# Patient Record
Sex: FEMALE | Race: BLACK OR AFRICAN AMERICAN | Employment: PART TIME | URBAN - METROPOLITAN AREA
[De-identification: names, ages, dates, MRNs, and addresses within clinical notes are randomized per-mention and may not be internally consistent; named-entity substitution may affect disease eponyms.]

---

## 2019-07-12 ENCOUNTER — OFFICE VISIT (OUTPATIENT)
Dept: INTERNAL MEDICINE CLINIC | Facility: CLINIC | Age: 39
End: 2019-07-12
Payer: COMMERCIAL

## 2019-07-12 VITALS
BODY MASS INDEX: 36.98 KG/M2 | HEIGHT: 67 IN | OXYGEN SATURATION: 98 % | SYSTOLIC BLOOD PRESSURE: 116 MMHG | DIASTOLIC BLOOD PRESSURE: 60 MMHG | WEIGHT: 235.63 LBS

## 2019-07-12 DIAGNOSIS — D50.0 IRON DEFICIENCY ANEMIA DUE TO CHRONIC BLOOD LOSS: ICD-10-CM

## 2019-07-12 DIAGNOSIS — L72.3 SEBACEOUS CYST: ICD-10-CM

## 2019-07-12 DIAGNOSIS — Z00.00 WELLNESS EXAMINATION: Primary | ICD-10-CM

## 2019-07-12 DIAGNOSIS — Z12.31 SCREENING MAMMOGRAM, ENCOUNTER FOR: ICD-10-CM

## 2019-07-12 PROCEDURE — 99386 PREV VISIT NEW AGE 40-64: CPT | Performed by: INTERNAL MEDICINE

## 2019-07-12 NOTE — PROGRESS NOTES
HPI:    Patient ID: Jatinder Bird is a 34year old female. Pt here for a general check up and getting established as a new pt.     Med hx; depression  Anemia iron deficiency  pyka for baking soda and baby powder  Surgical hx - Cholecystectomy  c sect normal heart sounds. Pulmonary/Chest: Effort normal. She has no wheezes. She has no rales. Abdominal: Soft. There is no tenderness.    Genitourinary:   Genitourinary Comments: Breasts no masses  No nipple anomaly   Musculoskeletal: She exhibits no tend

## 2019-07-16 ENCOUNTER — APPOINTMENT (OUTPATIENT)
Dept: LAB | Facility: HOSPITAL | Age: 39
End: 2019-07-16
Attending: INTERNAL MEDICINE
Payer: COMMERCIAL

## 2019-07-16 LAB
ALBUMIN SERPL-MCNC: 3.9 G/DL (ref 3.4–5)
ALBUMIN/GLOB SERPL: 0.9 {RATIO} (ref 1–2)
ALP LIVER SERPL-CCNC: 59 U/L (ref 37–98)
ALT SERPL-CCNC: 11 U/L (ref 13–56)
ANION GAP SERPL CALC-SCNC: 10 MMOL/L (ref 0–18)
AST SERPL-CCNC: 7 U/L (ref 15–37)
BASOPHILS # BLD AUTO: 0.12 X10(3) UL (ref 0–0.2)
BASOPHILS NFR BLD AUTO: 1.1 %
BILIRUB SERPL-MCNC: 0.9 MG/DL (ref 0.1–2)
BUN BLD-MCNC: 9 MG/DL (ref 7–18)
BUN/CREAT SERPL: 10.6 (ref 10–20)
CALCIUM BLD-MCNC: 8.5 MG/DL (ref 8.5–10.1)
CHLORIDE SERPL-SCNC: 104 MMOL/L (ref 98–112)
CHOLEST SMN-MCNC: 134 MG/DL (ref ?–200)
CO2 SERPL-SCNC: 24 MMOL/L (ref 21–32)
CREAT BLD-MCNC: 0.85 MG/DL (ref 0.55–1.02)
DEPRECATED HBV CORE AB SER IA-ACNC: 2.2 NG/ML (ref 12–240)
DEPRECATED RDW RBC AUTO: 43.6 FL (ref 35.1–46.3)
EOSINOPHIL # BLD AUTO: 0.23 X10(3) UL (ref 0–0.7)
EOSINOPHIL NFR BLD AUTO: 2.2 %
ERYTHROCYTE [DISTWIDTH] IN BLOOD BY AUTOMATED COUNT: 23.1 % (ref 11–15)
GLOBULIN PLAS-MCNC: 4.2 G/DL (ref 2.8–4.4)
GLUCOSE BLD-MCNC: 85 MG/DL (ref 70–99)
HCT VFR BLD AUTO: 27.7 % (ref 35–48)
HDLC SERPL-MCNC: 43 MG/DL (ref 40–59)
HGB BLD-MCNC: 7.2 G/DL (ref 12–16)
IMM GRANULOCYTES # BLD AUTO: 0.02 X10(3) UL (ref 0–1)
IMM GRANULOCYTES NFR BLD: 0.2 %
LDLC SERPL CALC-MCNC: 82 MG/DL (ref ?–100)
LYMPHOCYTES # BLD AUTO: 2.27 X10(3) UL (ref 1–4)
LYMPHOCYTES NFR BLD AUTO: 21.4 %
M PROTEIN MFR SERPL ELPH: 8.1 G/DL (ref 6.4–8.2)
MCH RBC QN AUTO: 14.9 PG (ref 26–34)
MCHC RBC AUTO-ENTMCNC: 26 G/DL (ref 31–37)
MCV RBC AUTO: 57.3 FL (ref 80–100)
MONOCYTES # BLD AUTO: 0.61 X10(3) UL (ref 0.1–1)
MONOCYTES NFR BLD AUTO: 5.8 %
NEUTROPHILS # BLD AUTO: 7.34 X10 (3) UL (ref 1.5–7.7)
NEUTROPHILS # BLD AUTO: 7.34 X10(3) UL (ref 1.5–7.7)
NEUTROPHILS NFR BLD AUTO: 69.3 %
NONHDLC SERPL-MCNC: 91 MG/DL (ref ?–130)
OSMOLALITY SERPL CALC.SUM OF ELEC: 284 MOSM/KG (ref 275–295)
PATIENT FASTING: YES
PATIENT FASTING: YES
PLATELET # BLD AUTO: 353 10(3)UL (ref 150–450)
PLATELET MORPHOLOGY: NORMAL
POTASSIUM SERPL-SCNC: 3.6 MMOL/L (ref 3.5–5.1)
RBC # BLD AUTO: 4.83 X10(6)UL (ref 3.8–5.3)
SODIUM SERPL-SCNC: 138 MMOL/L (ref 136–145)
TRIGL SERPL-MCNC: 45 MG/DL (ref 30–149)
TSI SER-ACNC: 2.01 MIU/ML (ref 0.36–3.74)
VLDLC SERPL CALC-MCNC: 9 MG/DL (ref 0–30)
WBC # BLD AUTO: 10.6 X10(3) UL (ref 4–11)

## 2019-07-16 PROCEDURE — 82728 ASSAY OF FERRITIN: CPT | Performed by: INTERNAL MEDICINE

## 2019-07-16 PROCEDURE — 80061 LIPID PANEL: CPT | Performed by: INTERNAL MEDICINE

## 2019-07-16 PROCEDURE — 85060 BLOOD SMEAR INTERPRETATION: CPT | Performed by: INTERNAL MEDICINE

## 2019-07-16 PROCEDURE — 85025 COMPLETE CBC W/AUTO DIFF WBC: CPT | Performed by: INTERNAL MEDICINE

## 2019-07-16 PROCEDURE — 36415 COLL VENOUS BLD VENIPUNCTURE: CPT | Performed by: INTERNAL MEDICINE

## 2019-07-16 PROCEDURE — 80053 COMPREHEN METABOLIC PANEL: CPT | Performed by: INTERNAL MEDICINE

## 2019-07-16 PROCEDURE — 84443 ASSAY THYROID STIM HORMONE: CPT | Performed by: INTERNAL MEDICINE

## 2019-07-23 ENCOUNTER — OFFICE VISIT (OUTPATIENT)
Dept: HEMATOLOGY/ONCOLOGY | Facility: HOSPITAL | Age: 39
End: 2019-07-23
Attending: INTERNAL MEDICINE
Payer: COMMERCIAL

## 2019-07-23 VITALS
HEIGHT: 67 IN | SYSTOLIC BLOOD PRESSURE: 110 MMHG | OXYGEN SATURATION: 98 % | RESPIRATION RATE: 16 BRPM | BODY MASS INDEX: 36.57 KG/M2 | DIASTOLIC BLOOD PRESSURE: 57 MMHG | HEART RATE: 95 BPM | TEMPERATURE: 98 F | WEIGHT: 233 LBS

## 2019-07-23 DIAGNOSIS — D50.0 IRON DEFICIENCY ANEMIA DUE TO CHRONIC BLOOD LOSS: Primary | ICD-10-CM

## 2019-07-23 PROCEDURE — 99244 OFF/OP CNSLTJ NEW/EST MOD 40: CPT | Performed by: INTERNAL MEDICINE

## 2019-07-23 RX ORDER — FOLIC ACID 1 MG/1
1 TABLET ORAL DAILY
Qty: 90 TABLET | Refills: 1 | Status: SHIPPED | OUTPATIENT
Start: 2019-07-23 | End: 2021-02-24

## 2019-07-23 NOTE — PROGRESS NOTES
HPI     Danni Riggs is a 34year old female who is here today for evaluation of anemia. The anemia has been present for at least the past 8 years. States she has been anemic since a teenager. Worse since 2011 when dx with fibroids.     Nutrit throat. Eyes: Negative for icterus. Respiratory: Positive for shortness of breath ([employer] with exhertion. ). Negative for cough. Cardiovascular: Positive for palpitations. Negative for chest pain.    Gastrointestinal: Positive for constipation (occasion frequency: Never      Drug use: No      Sexual activity: Not on file    Lifestyle      Physical activity:        Days per week: Not on file        Minutes per session: Not on file      Stress: Not on file    Relationships      Social connections:         Ta Mayda Ortiz is a 34year old female with Iron deficiency anemia due to chronic blood loss  (primary encounter diagnosis)      Anemia has been present for 8 years.     Recommend w/u as follows:    Patient is recommended to consider f/u with Gyn for MCH      26.0 - 34.0 pg 14.9 (L) 16.0 (L)   MCHC      31.0 - 37.0 g/dL 26.0 (L) 28.0 (L)   RDW-SD      35.1 - 46.3 fL 43.6    RDW      11.0 - 15.0 % 23.1 (H) 20.5 (H)   Platelet Count      238.3 - 450.0 10(3)uL 353.0 248.0   Prelim Neutrophil Abs      1. sample is collected from a person who is consuming high dose of biotin supplements resulting in biotin serum concentrations >100 ng/mL.  It is recommended that physicians ask all patients who may be on biotin supplementation to stop biotin consumption at [employer]

## 2019-07-23 NOTE — PATIENT INSTRUCTIONS
Iron Sucrose injection  Brand Name: Venofer  What is this medicine? IRON SUCROSE (ZIGGY guzman CORNELIO jonas) is an iron complex. Iron is used to make healthy red blood cells, which carry oxygen and nutrients throughout the body.  This medicine is used to treat i This drug is given in a hospital or clinic and will not be stored at home. What should I tell my health care provider before I take this medicine?   They need to know if you have any of these conditions:  · anemia not caused by low iron levels  · heart dis

## 2019-07-25 ENCOUNTER — TELEPHONE (OUTPATIENT)
Dept: HEMATOLOGY/ONCOLOGY | Facility: HOSPITAL | Age: 39
End: 2019-07-25

## 2019-07-25 NOTE — TELEPHONE ENCOUNTER
Patient would like FMLA forms filled out or a Dr's note for her job in order to do her treatments or until she gets her energy back, patient stated she called 7/24/19, please advise.

## 2019-07-29 ENCOUNTER — OFFICE VISIT (OUTPATIENT)
Dept: HEMATOLOGY/ONCOLOGY | Facility: HOSPITAL | Age: 39
End: 2019-07-29
Attending: INTERNAL MEDICINE
Payer: COMMERCIAL

## 2019-07-29 VITALS
TEMPERATURE: 99 F | RESPIRATION RATE: 16 BRPM | HEART RATE: 91 BPM | OXYGEN SATURATION: 97 % | DIASTOLIC BLOOD PRESSURE: 51 MMHG | SYSTOLIC BLOOD PRESSURE: 104 MMHG

## 2019-07-29 DIAGNOSIS — D50.0 IRON DEFICIENCY ANEMIA DUE TO CHRONIC BLOOD LOSS: Primary | ICD-10-CM

## 2019-07-29 PROCEDURE — 96374 THER/PROPH/DIAG INJ IV PUSH: CPT

## 2019-07-29 NOTE — PROGRESS NOTES
Pt to infusion for Venofer 1/5. Labs from 7/16. Pt reports feeling \"tired all of the time for the last 10 years. \" Reports ALEMAN. Discussed Venofer administration and possible s/e with patient, who verbalizes understanding. Lexicomp handout given.   PIV

## 2019-07-31 ENCOUNTER — OFFICE VISIT (OUTPATIENT)
Dept: HEMATOLOGY/ONCOLOGY | Facility: HOSPITAL | Age: 39
End: 2019-07-31
Attending: INTERNAL MEDICINE
Payer: COMMERCIAL

## 2019-07-31 VITALS
TEMPERATURE: 99 F | SYSTOLIC BLOOD PRESSURE: 114 MMHG | DIASTOLIC BLOOD PRESSURE: 66 MMHG | RESPIRATION RATE: 16 BRPM | OXYGEN SATURATION: 96 % | HEART RATE: 83 BPM

## 2019-07-31 DIAGNOSIS — D50.0 IRON DEFICIENCY ANEMIA DUE TO CHRONIC BLOOD LOSS: Primary | ICD-10-CM

## 2019-07-31 PROCEDURE — 96374 THER/PROPH/DIAG INJ IV PUSH: CPT

## 2019-07-31 NOTE — PROGRESS NOTES
Patient arrives for venofer 2 of 5. Patient reports she is feeling well, complains of some fatigue and metallic taste in her mouth after the first one. PIV started in left AC, positive blood return noted.  Venofer given slowly over 5 minutes, positive blood

## 2019-08-02 ENCOUNTER — OFFICE VISIT (OUTPATIENT)
Dept: HEMATOLOGY/ONCOLOGY | Facility: HOSPITAL | Age: 39
End: 2019-08-02
Attending: INTERNAL MEDICINE
Payer: COMMERCIAL

## 2019-08-02 VITALS
HEART RATE: 107 BPM | TEMPERATURE: 99 F | SYSTOLIC BLOOD PRESSURE: 128 MMHG | DIASTOLIC BLOOD PRESSURE: 62 MMHG | OXYGEN SATURATION: 98 % | RESPIRATION RATE: 16 BRPM

## 2019-08-02 DIAGNOSIS — D50.0 IRON DEFICIENCY ANEMIA DUE TO CHRONIC BLOOD LOSS: Primary | ICD-10-CM

## 2019-08-02 PROCEDURE — 96374 THER/PROPH/DIAG INJ IV PUSH: CPT

## 2019-08-02 NOTE — PROGRESS NOTES
Patient arrives for venofer 3 of 5. Patient reports she is feeling well, complains of ongoing fatigue . PIV started in left AC, positive blood return noted. Venofer given slowly over 5 minutes, positive blood return noted throughout.  Patient observed 30 mi

## 2019-08-05 ENCOUNTER — OFFICE VISIT (OUTPATIENT)
Dept: HEMATOLOGY/ONCOLOGY | Facility: HOSPITAL | Age: 39
End: 2019-08-05
Attending: INTERNAL MEDICINE
Payer: COMMERCIAL

## 2019-08-05 VITALS
DIASTOLIC BLOOD PRESSURE: 53 MMHG | RESPIRATION RATE: 16 BRPM | SYSTOLIC BLOOD PRESSURE: 108 MMHG | TEMPERATURE: 99 F | OXYGEN SATURATION: 97 % | HEART RATE: 85 BPM

## 2019-08-05 DIAGNOSIS — D50.0 IRON DEFICIENCY ANEMIA DUE TO CHRONIC BLOOD LOSS: Primary | ICD-10-CM

## 2019-08-05 PROCEDURE — 96374 THER/PROPH/DIAG INJ IV PUSH: CPT

## 2019-08-05 NOTE — PROGRESS NOTES
Patient arrives for venofer 4 of 5. Patient reports she is feeling well except feeling more fatigued. [facility] PIV started in left AC, positive blood return noted. Venofer given slowly over 5 minutes, positive blood return noted throughout.   Patient tolerated we

## 2019-08-07 ENCOUNTER — OFFICE VISIT (OUTPATIENT)
Dept: HEMATOLOGY/ONCOLOGY | Facility: HOSPITAL | Age: 39
End: 2019-08-07
Attending: INTERNAL MEDICINE
Payer: COMMERCIAL

## 2019-08-07 VITALS
OXYGEN SATURATION: 95 % | DIASTOLIC BLOOD PRESSURE: 54 MMHG | TEMPERATURE: 99 F | HEART RATE: 85 BPM | RESPIRATION RATE: 16 BRPM | SYSTOLIC BLOOD PRESSURE: 111 MMHG

## 2019-08-07 DIAGNOSIS — D50.0 IRON DEFICIENCY ANEMIA DUE TO CHRONIC BLOOD LOSS: Primary | ICD-10-CM

## 2019-08-07 PROCEDURE — 96374 THER/PROPH/DIAG INJ IV PUSH: CPT

## 2019-08-07 NOTE — PROGRESS NOTES
Patient arrives for venofer 5 of 5. Patient reports she is feeling well except remains fatigued. [facility] PIV started in rt AC, positive blood return noted. Venofer given slowly IVP, positive blood return noted throughout. Patient tolerated well.  PIV removed, s

## 2019-10-23 ENCOUNTER — OFFICE VISIT (OUTPATIENT)
Dept: HEMATOLOGY/ONCOLOGY | Facility: HOSPITAL | Age: 39
End: 2019-10-23
Attending: INTERNAL MEDICINE
Payer: COMMERCIAL

## 2019-10-23 ENCOUNTER — LAB ENCOUNTER (OUTPATIENT)
Dept: LAB | Facility: HOSPITAL | Age: 39
End: 2019-10-23
Attending: INTERNAL MEDICINE
Payer: COMMERCIAL

## 2019-10-23 VITALS
HEIGHT: 67 IN | WEIGHT: 234 LBS | OXYGEN SATURATION: 97 % | TEMPERATURE: 98 F | SYSTOLIC BLOOD PRESSURE: 113 MMHG | BODY MASS INDEX: 36.73 KG/M2 | HEART RATE: 80 BPM | RESPIRATION RATE: 16 BRPM | DIASTOLIC BLOOD PRESSURE: 42 MMHG

## 2019-10-23 DIAGNOSIS — D50.0 IRON DEFICIENCY ANEMIA DUE TO CHRONIC BLOOD LOSS: ICD-10-CM

## 2019-10-23 DIAGNOSIS — D50.0 IRON DEFICIENCY ANEMIA DUE TO CHRONIC BLOOD LOSS: Primary | ICD-10-CM

## 2019-10-23 DIAGNOSIS — Z51.81 ENCOUNTER FOR MEDICATION MONITORING: ICD-10-CM

## 2019-10-23 PROCEDURE — 36415 COLL VENOUS BLD VENIPUNCTURE: CPT

## 2019-10-23 PROCEDURE — 82728 ASSAY OF FERRITIN: CPT

## 2019-10-23 PROCEDURE — 99214 OFFICE O/P EST MOD 30 MIN: CPT | Performed by: INTERNAL MEDICINE

## 2019-10-23 PROCEDURE — 83540 ASSAY OF IRON: CPT

## 2019-10-23 PROCEDURE — 84466 ASSAY OF TRANSFERRIN: CPT

## 2019-10-23 PROCEDURE — 85025 COMPLETE CBC W/AUTO DIFF WBC: CPT

## 2019-10-23 NOTE — PROGRESS NOTES
EDITH     Esmer Arredondo is a 37year old female who is here today for f/u of Iron deficiency anemia due to chronic blood loss  (primary encounter diagnosis)  Encounter for medication monitoring      Patient completed 5 doses of 200 mg of Venofer on 8/7 2013     Social History    Tobacco Use      Smoking status: Never Smoker      Smokeless tobacco: Never Used    Alcohol use: Yes      Frequency: 2-4 times a month      Drinks per session: 1 or 2      Binge frequency: Never    Drug use: No      Family Hi deficiency recommend option of po iron replacement tid vs venofer if patient intolerant of po iron. All iron replacement with folic acid replacement to assist in hematopoesis.       D/w the patient the potential SE of treatment and provided written informa 7/16/2019   WBC      4.0 - 11.0 x10(3) uL 11.2 (H) 10.6   RBC      3.80 - 5.30 x10(6)uL 4.73 4.83   Hemoglobin      12.0 - 16.0 g/dL 10.0 (L) 7.2 (L)   Hematocrit      35.0 - 48.0 % 33.7 (L) 27.7 (L)   MCV      80.0 - 100.0 fL 71.2 (L) 57.3 (L)   MCH Yes   Iron, Serum      50 - 170 ug/dL 16 (L)    Transferrin      200 - 360 mg/dL 322    Iron Bind. Cap.(TIBC)      240 - 450 ug/dL 480 (H)    Iron Saturation      15 - 50 % 3 (L)    FERRITIN      12.0 - 240.0 ng/mL 3.6 (L) 2.2 (L)

## 2019-11-05 ENCOUNTER — OFFICE VISIT (OUTPATIENT)
Dept: HEMATOLOGY/ONCOLOGY | Facility: HOSPITAL | Age: 39
End: 2019-11-05
Attending: INTERNAL MEDICINE
Payer: COMMERCIAL

## 2019-11-05 VITALS
DIASTOLIC BLOOD PRESSURE: 45 MMHG | HEART RATE: 75 BPM | RESPIRATION RATE: 16 BRPM | OXYGEN SATURATION: 97 % | TEMPERATURE: 98 F | SYSTOLIC BLOOD PRESSURE: 100 MMHG

## 2019-11-05 DIAGNOSIS — D50.0 IRON DEFICIENCY ANEMIA DUE TO CHRONIC BLOOD LOSS: Primary | ICD-10-CM

## 2019-11-05 PROCEDURE — 96374 THER/PROPH/DIAG INJ IV PUSH: CPT

## 2019-11-05 NOTE — PROGRESS NOTES
First of five doses of venofer 200mg given today. Has had venofer in the past and states tolerated well. Ordered by  for iron deficiency anemia. Does have fatigue with.  HGB level 10 on 10/25    PIV established with brisk blood return noted, venofer 2

## 2019-11-08 ENCOUNTER — OFFICE VISIT (OUTPATIENT)
Dept: HEMATOLOGY/ONCOLOGY | Facility: HOSPITAL | Age: 39
End: 2019-11-08
Attending: INTERNAL MEDICINE
Payer: COMMERCIAL

## 2019-11-08 VITALS
TEMPERATURE: 98 F | SYSTOLIC BLOOD PRESSURE: 119 MMHG | OXYGEN SATURATION: 96 % | RESPIRATION RATE: 16 BRPM | HEART RATE: 79 BPM | DIASTOLIC BLOOD PRESSURE: 46 MMHG

## 2019-11-08 DIAGNOSIS — D50.0 IRON DEFICIENCY ANEMIA DUE TO CHRONIC BLOOD LOSS: Primary | ICD-10-CM

## 2019-11-08 PROCEDURE — 96374 THER/PROPH/DIAG INJ IV PUSH: CPT

## 2019-11-08 NOTE — PROGRESS NOTES
Patient arrives for venofer 2 of 5. Patient reports she is feeling well, complains of ongoing fatigue . PIV started in left AC, positive blood return noted. Venofer given slowly over 5 minutes, positive blood return noted throughout.  Patient tolerated well

## 2019-11-12 ENCOUNTER — OFFICE VISIT (OUTPATIENT)
Dept: HEMATOLOGY/ONCOLOGY | Facility: HOSPITAL | Age: 39
End: 2019-11-12
Attending: INTERNAL MEDICINE
Payer: COMMERCIAL

## 2019-11-12 VITALS
RESPIRATION RATE: 16 BRPM | DIASTOLIC BLOOD PRESSURE: 58 MMHG | HEART RATE: 73 BPM | TEMPERATURE: 98 F | SYSTOLIC BLOOD PRESSURE: 125 MMHG | OXYGEN SATURATION: 100 %

## 2019-11-12 DIAGNOSIS — D50.0 IRON DEFICIENCY ANEMIA DUE TO CHRONIC BLOOD LOSS: Primary | ICD-10-CM

## 2019-11-12 PROCEDURE — 96374 THER/PROPH/DIAG INJ IV PUSH: CPT

## 2019-11-12 NOTE — PROGRESS NOTES
Patient arrives for venofer 3 of 5. Patient reports she is feeling well, complains of some fatigue and metallic taste in her mouth after the first one. PIV started in left AC, positive blood return noted.  Venofer given slowly over 5 minutes, positive blood

## 2019-11-15 ENCOUNTER — OFFICE VISIT (OUTPATIENT)
Dept: HEMATOLOGY/ONCOLOGY | Facility: HOSPITAL | Age: 39
End: 2019-11-15
Attending: INTERNAL MEDICINE
Payer: COMMERCIAL

## 2019-11-15 VITALS
TEMPERATURE: 98 F | SYSTOLIC BLOOD PRESSURE: 98 MMHG | DIASTOLIC BLOOD PRESSURE: 68 MMHG | HEART RATE: 79 BPM | OXYGEN SATURATION: 99 % | RESPIRATION RATE: 18 BRPM

## 2019-11-15 DIAGNOSIS — D50.0 IRON DEFICIENCY ANEMIA DUE TO CHRONIC BLOOD LOSS: Primary | ICD-10-CM

## 2019-11-15 PROCEDURE — 96374 THER/PROPH/DIAG INJ IV PUSH: CPT

## 2019-11-15 NOTE — PROGRESS NOTES
$th of 5 doses Ordered by  for iron deficiency anemia. Does have fatigue with. HGB level 10 on 10/25    PIV established with brisk blood return noted, venofer 200mg iv push slow over 5 minutes given by this nurse.  positive blood return noted every 1-

## 2019-11-19 ENCOUNTER — OFFICE VISIT (OUTPATIENT)
Dept: HEMATOLOGY/ONCOLOGY | Facility: HOSPITAL | Age: 39
End: 2019-11-19
Attending: INTERNAL MEDICINE
Payer: COMMERCIAL

## 2019-11-19 VITALS
TEMPERATURE: 98 F | OXYGEN SATURATION: 98 % | RESPIRATION RATE: 18 BRPM | HEART RATE: 87 BPM | SYSTOLIC BLOOD PRESSURE: 111 MMHG | DIASTOLIC BLOOD PRESSURE: 54 MMHG

## 2019-11-19 DIAGNOSIS — D50.0 IRON DEFICIENCY ANEMIA DUE TO CHRONIC BLOOD LOSS: Primary | ICD-10-CM

## 2019-11-19 PROCEDURE — 96374 THER/PROPH/DIAG INJ IV PUSH: CPT

## 2019-11-19 RX ORDER — HEPARIN SODIUM (PORCINE) LOCK FLUSH IV SOLN 100 UNIT/ML 100 UNIT/ML
SOLUTION INTRAVENOUS
Status: DISCONTINUED
Start: 2019-11-19 | End: 2019-11-19 | Stop reason: WASHOUT

## 2019-11-19 NOTE — PROGRESS NOTES
Patient arrives for venofer 5 of 5. Patient reports she is feeling well, complains of some fatigue and metallic taste in her mouth after the first one. PIV started in left AC, positive blood return noted.  Venofer given slowly over 5 minutes, positive blood

## 2019-12-11 ENCOUNTER — OFFICE VISIT (OUTPATIENT)
Dept: OBGYN CLINIC | Facility: CLINIC | Age: 39
End: 2019-12-11
Payer: COMMERCIAL

## 2019-12-11 VITALS
HEIGHT: 67 IN | SYSTOLIC BLOOD PRESSURE: 124 MMHG | BODY MASS INDEX: 37.83 KG/M2 | DIASTOLIC BLOOD PRESSURE: 66 MMHG | HEART RATE: 88 BPM | WEIGHT: 241 LBS

## 2019-12-11 DIAGNOSIS — N93.9 ABNORMAL UTERINE BLEEDING (AUB): ICD-10-CM

## 2019-12-11 DIAGNOSIS — Z01.419 WELL WOMAN EXAM: Primary | ICD-10-CM

## 2019-12-11 DIAGNOSIS — Z80.3 FAMILY HISTORY OF BREAST CANCER: ICD-10-CM

## 2019-12-11 DIAGNOSIS — Z12.4 SCREENING FOR MALIGNANT NEOPLASM OF CERVIX: ICD-10-CM

## 2019-12-11 DIAGNOSIS — Z12.31 SCREENING MAMMOGRAM, ENCOUNTER FOR: ICD-10-CM

## 2019-12-11 PROCEDURE — 99213 OFFICE O/P EST LOW 20 MIN: CPT | Performed by: OBSTETRICS & GYNECOLOGY

## 2019-12-11 PROCEDURE — 99386 PREV VISIT NEW AGE 40-64: CPT | Performed by: OBSTETRICS & GYNECOLOGY

## 2019-12-11 NOTE — H&P
HPI:  The patient is a 55-year-old obese female who presents for well woman examination today. New patient to the practice. Patient has a history of uterine fibroids and is complaining of heavy menstrual bleeding.   The patient previously receiving care Non-medical: Not on file    Tobacco Use      Smoking status: Never Smoker      Smokeless tobacco: Never Used    Substance and Sexual Activity      Alcohol use: Yes        Frequency: 2-4 times a month        Drinks per session: 1 or 2        Binge frequency pain, lumps, or discharge. Neurological:  denies headaches, extremity weakness  Psychiatric: denies depression or anxiety.        12/11/19  1045   BP: 124/66   Pulse: 88       PHYSICAL EXAM:   Constitutional: well developed, well nourished  Head/Face: nor for baseline. Given her age and obesity I will recommend a endometrial biopsy. The patient had a CBC in October that showed her hemoglobin to be 10. She states is been as low as 7. Patient had normal thyroid testing in July.   We did discuss that after

## 2019-12-18 ENCOUNTER — TELEPHONE (OUTPATIENT)
Dept: OBGYN CLINIC | Facility: CLINIC | Age: 39
End: 2019-12-18

## 2019-12-18 NOTE — TELEPHONE ENCOUNTER
----- Message from Shelby Pabon DO sent at 12/17/2019  4:12 PM CST -----  QUEENIE pap; needs colpo with ecc and emb at same appt. UPT day of appt. Not to be done while menstruating.

## 2019-12-26 NOTE — TELEPHONE ENCOUNTER
Informed pt of results and JOSÉ MIGUEL recs below. Assisted pt with scheduling appt for 1/7/2020 at 120pm. Advised pt to take 600 mg of Ibuprofen with food 30-60 minutes before appt time. Advised pt she will german to do UPT at time of appt.  Pt verbalized understandi

## 2019-12-28 ENCOUNTER — HOSPITAL ENCOUNTER (EMERGENCY)
Facility: HOSPITAL | Age: 39
Discharge: HOME | End: 2019-12-28
Attending: PHYSICIAN ASSISTANT
Payer: COMMERCIAL

## 2019-12-28 VITALS
RESPIRATION RATE: 20 BRPM | BODY MASS INDEX: 37.67 KG/M2 | HEIGHT: 67 IN | OXYGEN SATURATION: 99 % | WEIGHT: 240 LBS | HEART RATE: 98 BPM | SYSTOLIC BLOOD PRESSURE: 131 MMHG | DIASTOLIC BLOOD PRESSURE: 97 MMHG | TEMPERATURE: 99 F

## 2019-12-28 DIAGNOSIS — R03.0 ELEVATED BLOOD PRESSURE READING: ICD-10-CM

## 2019-12-28 DIAGNOSIS — K04.7 DENTAL ABSCESS: Primary | ICD-10-CM

## 2019-12-28 PROCEDURE — 99283 EMERGENCY DEPT VISIT LOW MDM: CPT

## 2019-12-28 RX ORDER — HYDROCODONE BITARTRATE AND ACETAMINOPHEN 5; 325 MG/1; MG/1
1 TABLET ORAL EVERY 6 HOURS PRN
Qty: 12 TABLET | Refills: 0 | Status: SHIPPED | OUTPATIENT
Start: 2019-12-28 | End: 2020-01-04

## 2019-12-28 RX ORDER — IBUPROFEN 600 MG/1
600 TABLET ORAL ONCE
Status: COMPLETED | OUTPATIENT
Start: 2019-12-28 | End: 2019-12-28

## 2019-12-28 RX ORDER — IBUPROFEN 600 MG/1
TABLET ORAL
Qty: 20 TABLET | Refills: 0 | Status: SHIPPED | OUTPATIENT
Start: 2019-12-28 | End: 2020-11-03

## 2019-12-28 RX ORDER — PENICILLIN V POTASSIUM 500 MG/1
500 TABLET ORAL 4 TIMES DAILY
Qty: 28 TABLET | Refills: 0 | Status: SHIPPED | OUTPATIENT
Start: 2019-12-28 | End: 2020-01-04
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
## 2019-12-28 NOTE — ED PROVIDER NOTES
Patient Seen in: [facility] Emergency Department    History   Patient presents with:  Dental Problem    Stated Complaint: tooth pain    EDITH    Anamaria Meyer is a 37year old female who presents with chief complaint of right lower toothache.   P and vital signs reviewed. All other systems reviewed and negative except as noted above. PSFH elements reviewed from today and agreed except as otherwise stated in HPI.     Physical Exam     ED Triage Vitals [12/28/19 1116]   BP (!) 131/97   Pulse 9 intact. There is no obvious deformity. Neurological: Gross motor movement is intact in all 4 extremities. Patient exhibits normal speech. Skin: Skin is normal to inspection. Warm and dry. No obvious bruising. No obvious rash.     ED Course   Labs Rev tablet Refills: 0    HYDROcodone-acetaminophen 5-325 MG Oral Tab  Take 1 tablet by mouth every 6 (six) hours as needed for Pain (Severe pain).   Qty: 12 tablet Refills: 0

## 2020-01-09 NOTE — TELEPHONE ENCOUNTER
Pt rescheduled colpo and EMBX for 1/14 with JOSÉ MIGUEL. Pt advised she will need UPT at time of appt. Pt verbalized understanding.

## 2020-01-20 ENCOUNTER — TELEPHONE (OUTPATIENT)
Dept: OBGYN CLINIC | Facility: CLINIC | Age: 40
End: 2020-01-20

## 2020-01-21 ENCOUNTER — APPOINTMENT (OUTPATIENT)
Dept: HEMATOLOGY/ONCOLOGY | Facility: HOSPITAL | Age: 40
End: 2020-01-21
Attending: INTERNAL MEDICINE
Payer: COMMERCIAL

## 2020-01-28 ENCOUNTER — OFFICE VISIT (OUTPATIENT)
Dept: OBGYN CLINIC | Facility: CLINIC | Age: 40
End: 2020-01-28
Payer: COMMERCIAL

## 2020-01-28 VITALS
DIASTOLIC BLOOD PRESSURE: 73 MMHG | WEIGHT: 249 LBS | BODY MASS INDEX: 39 KG/M2 | SYSTOLIC BLOOD PRESSURE: 110 MMHG | HEART RATE: 101 BPM

## 2020-01-28 DIAGNOSIS — Z32.00 PREGNANCY EXAMINATION OR TEST, PREGNANCY UNCONFIRMED: ICD-10-CM

## 2020-01-28 DIAGNOSIS — R87.619 ATYPICAL GLANDULAR CELLS OF UNDETERMINED SIGNIFICANCE (AGUS) ON CERVICAL PAP SMEAR: Primary | ICD-10-CM

## 2020-01-28 PROCEDURE — 81025 URINE PREGNANCY TEST: CPT | Performed by: OBSTETRICS & GYNECOLOGY

## 2020-01-28 PROCEDURE — 57454 BX/CURETT OF CERVIX W/SCOPE: CPT | Performed by: OBSTETRICS & GYNECOLOGY

## 2020-01-28 PROCEDURE — 58110 BX DONE W/COLPOSCOPY ADD-ON: CPT | Performed by: OBSTETRICS & GYNECOLOGY

## 2020-01-28 NOTE — PROCEDURES
Colpo with Endometrial Biopsy    Pregnancy Results: negative from urine test   Birth control method(s) used: n/a    Consent signed. Procedure discussed with patient in detail including indication, risk, benefits, alternatives and complications.     Indic

## 2020-01-29 LAB — CONTROL LINE PRESENT WITH A CLEAR BACKGROUND (YES/NO): YES YES/NO

## 2020-01-30 ENCOUNTER — TELEPHONE (OUTPATIENT)
Dept: OBGYN CLINIC | Facility: CLINIC | Age: 40
End: 2020-01-30

## 2020-01-31 ENCOUNTER — TELEPHONE (OUTPATIENT)
Dept: OBGYN CLINIC | Facility: CLINIC | Age: 40
End: 2020-01-31

## 2020-01-31 DIAGNOSIS — C54.1 ENDOMETRIAL CANCER, GRADE I (HCC): Primary | ICD-10-CM

## 2020-01-31 NOTE — TELEPHONE ENCOUNTER
Called pt to discuss EMB results but didn't answer. Unable to leave  as mailbox is full.   Unable to send [employer] as it is not set up

## 2020-01-31 NOTE — TELEPHONE ENCOUNTER
Attempted to call cell phone again but mailbox unable to leave msg. Called work and left VM on her work line.

## 2020-01-31 NOTE — TELEPHONE ENCOUNTER
Spoke to patient. Has figo grade 1 endometrial CA. Needs referral and contact info to see Dr. Lorie Montanez. Please coordinate.

## 2020-01-31 NOTE — TELEPHONE ENCOUNTER
Referral initiated and provided pt with # to [facility] to schedule appt. Informed pt the referral authorization can take a few days and we will fax a copy of the approved referral to [facility].

## 2020-02-03 ENCOUNTER — TELEPHONE (OUTPATIENT)
Dept: HEMATOLOGY/ONCOLOGY | Facility: HOSPITAL | Age: 40
End: 2020-02-03

## 2020-02-03 NOTE — TELEPHONE ENCOUNTER
Returned patient call. She wanted us to know she is going to see Dr. Laura Trejo for a newly diagnosed endometrial cancer.   Discussed with patient that Dr. Felecia Beaver states she thinks that Dr. Laura Trejo is an appropriate referral and she should continue with follow up

## 2020-02-03 NOTE — TELEPHONE ENCOUNTER
Jose Angel called regarding her new stage 1 uterine cancer dx. She says she was referred to a surgeon by her gynecologist, and would like Lin[facility] ANGELINA Martin opinion.  Please call

## 2020-02-04 ENCOUNTER — TELEPHONE (OUTPATIENT)
Dept: OBGYN CLINIC | Facility: CLINIC | Age: 40
End: 2020-02-04

## 2020-02-04 ENCOUNTER — HOSPITAL ENCOUNTER (OUTPATIENT)
Dept: MAMMOGRAPHY | Facility: HOSPITAL | Age: 40
Discharge: HOME | End: 2020-02-04
Attending: OBSTETRICS & GYNECOLOGY
Payer: COMMERCIAL

## 2020-02-04 DIAGNOSIS — Z12.31 SCREENING MAMMOGRAM, ENCOUNTER FOR: ICD-10-CM

## 2020-02-04 PROCEDURE — 77067 SCR MAMMO BI INCL CAD: CPT | Performed by: OBSTETRICS & GYNECOLOGY

## 2020-02-04 PROCEDURE — 77063 BREAST TOMOSYNTHESIS BI: CPT | Performed by: OBSTETRICS & GYNECOLOGY

## 2020-02-04 NOTE — TELEPHONE ENCOUNTER
Pt needs to update and talk to nurse about what is going on .  Pt was seen at [facility] as requested ,

## 2020-02-04 NOTE — TELEPHONE ENCOUNTER
Pt called Dr Sanket Nj office for appt but was informed they need an approved referral and records. Informed pt referral is authorized and nurse will fax it along with most recent annual notes, Pap, HPV and Colpo results. Pt is appreciative.

## 2020-02-13 ENCOUNTER — TELEPHONE (OUTPATIENT)
Dept: INTERNAL MEDICINE CLINIC | Facility: CLINIC | Age: 40
End: 2020-02-13

## 2020-02-13 NOTE — TELEPHONE ENCOUNTER
Spoke with referral team who informed me that the surgeon needs to call [employer] directly and ask for an \"Authorization\" not referral.       Phone number available for me to call was to their Business Office at [facility] who insisted a referral needed to be ghosh

## 2020-02-13 NOTE — TELEPHONE ENCOUNTER
DRU Nava calling from [facility] requesting referral authorization for following surgery. Outpatient hysterectomy w/ Dr. Annie Brandon.   On 02/24/20    CPT codes   82576  25317  44677  Dx Code    C54.1    Pt referred to

## 2020-02-14 DIAGNOSIS — Z80.3 FAMILY HISTORY OF BREAST CANCER IN MOTHER: Primary | ICD-10-CM

## 2020-02-14 NOTE — PROGRESS NOTES
Pt advised of results and recs and verbalized understanding. Referral submitted and provided # to schedule appt.

## 2020-02-19 ENCOUNTER — TELEPHONE (OUTPATIENT)
Dept: OBGYN CLINIC | Facility: CLINIC | Age: 40
End: 2020-02-19

## 2020-02-19 DIAGNOSIS — C54.1 ENDOMETRIAL SARCOMA (HCC): Primary | ICD-10-CM

## 2020-02-19 NOTE — TELEPHONE ENCOUNTER
Lm for Brandy at [facility] to ask if referral or PA is required and we also need CPT codes.

## 2020-02-19 NOTE — TELEPHONE ENCOUNTER
RECEIVED CALLBACK WITH DX CODE OF C54.1 AND CPT'S: 48209, [id number] AND [id number]. NEED TO FAX APPROVED REFERRAL TO [facility] AT FAX # [phone]. STAT REFERRAL INITIATED.

## 2020-02-21 ENCOUNTER — TELEPHONE (OUTPATIENT)
Dept: OBGYN CLINIC | Facility: CLINIC | Age: 40
End: 2020-02-21

## 2020-02-21 DIAGNOSIS — C54.1 ENDOMETRIAL SARCOMA (HCC): Primary | ICD-10-CM

## 2020-02-21 NOTE — TELEPHONE ENCOUNTER
Patient's OBYGYN is handling this referral.      Our referral team is helping to straighten out this issue. Not the PCP responsibility.

## 2020-02-21 NOTE — TELEPHONE ENCOUNTER
IT APPEARS WE HAVE THE WRONG INSURANCE INFO.  [address] [facility] AND WE DO NOT HAVE A COPY OF THE CARD. LEFT MESSAGE FOR VENUS ASKING HER TO FAX THE INSURANCE CARD TO US SO WE CAN WORK ON THE REFERRAL AUTHORIZATION.

## 2020-02-21 NOTE — TELEPHONE ENCOUNTER
Brandy from [facility], Dr. Josh Aly called stated needs authorized referral for below    DR. Rahul Bach ON 2-24-20   CPT: 05428, [address]38, [address] REFERRAL TO FAX : [phone]    Brandy will fax referal to [facility]

## 2020-02-21 NOTE — TELEPHONE ENCOUNTER
LMTCB.  NEED TO CONFIRM PT'S . IF WE HAVE THE CORRECT  THEN PT NEEDS TO CALL THE INSURANCE AND GET  CHANGED SO WE CAN GET AUTHORIZATION FOR HER SURGERY ON 20.

## 2020-02-21 NOTE — TELEPHONE ENCOUNTER
LMTCB FOR [facility] CARE TO GET STATUS OF REFERRAL. NOTIFIED PRIYANK AT [address] [facility] I'M WAITING SOME INFO ON THE STATUS OF THIS REFERRAL.

## 2020-02-21 NOTE — TELEPHONE ENCOUNTER
HAD VOICE MAIL FROM VENUS AT [facility] ASKING FOR STATUS OF REFERRAL. THE REFERRAL SHOWS IT IS APPROVED BUT FOR A CONSULT, NOT FOR THE LISTED CPT CODES.   I CALLED [facility] CARE AND WAS TOLD THEY DON'T DO REFERRALS FOR [employer] AND IT APPEARS THE REFERRAL AUTO AP

## 2020-02-21 NOTE — TELEPHONE ENCOUNTER
Called pt insurance and was on the phone for over 1 hr in regards to pt referral for surgery that is being done at [facility] with Dr. Alvin Barragan.  Referral was authorized and faxed to [facility] ([phone]) per Gisella Page note dated 2/21/2020 at 3:32pm.

## 2020-02-21 NOTE — TELEPHONE ENCOUNTER
SPOKE WITH PT AND SHE CONFIRMED THE INSURANCE WE HAVE LISTED IS CORRECT AND THAT IT IS [id number] HMO. CALLED INSURANCE AGAIN AND WAS ABLE TO VERIFY BENEFITS (MUST HAVE INPUT A WRONG NUMBER EARLIER).   AND IT SAYS PRE-AUTHORIZATION IS NEEDED.  GOT CALL REF# [id number]

## 2020-02-21 NOTE — TELEPHONE ENCOUNTER
[facility] with [facility] [phone]    Needs referral for Monday surgery with Dr. Brian Bhatia, Surgeon     Insurance/[employer] says Patient must have a referral from primary doctor

## 2020-02-24 ENCOUNTER — TELEPHONE (OUTPATIENT)
Dept: INTERNAL MEDICINE CLINIC | Facility: CLINIC | Age: 40
End: 2020-02-24

## 2020-02-24 ENCOUNTER — TELEPHONE (OUTPATIENT)
Dept: OBGYN CLINIC | Facility: CLINIC | Age: 40
End: 2020-02-24

## 2020-02-24 NOTE — TELEPHONE ENCOUNTER
Spoke with Arcelia Meneses from [facility] ( [phone]) to make sure that they have received the authorizations that were needed.  He confirmed that it was received, insurance was verified, and he spoke with the surgery coordinator [facility], from

## 2020-02-24 NOTE — TELEPHONE ENCOUNTER
Mom is calling to speak to someone about pts referral   States pts surgery was canceled due to referral     Mom is upset

## 2020-02-24 NOTE — TELEPHONE ENCOUNTER
Mother called very upset. She is at [facility] and patient's surgery was cancelled after she got into the surgical prep area. Was told it was cancelled because they need a referral not only from Gynecologist but from PCP.  Informed Mother that this is incorre

## 2020-02-24 NOTE — TELEPHONE ENCOUNTER
Spoke with pt's mom ( see other 2-24-20 encounter).  I lm for managed care and for Brandy at [facility] to see what can be done about pt's referral.

## 2020-02-24 NOTE — TELEPHONE ENCOUNTER
Spoke with pt's mom regarding surgery that was cancelled at [facility]. Authorization was sent to [facility] ( see communication) by [facility] care on 2/21/20.  [facility] states that [facility] did not receive the authorization and that Idalmis Clements is requiring referral fo

## 2020-02-24 NOTE — TELEPHONE ENCOUNTER
Pt is on conference call with Roberto mike and would like to speak to nurse regarding a canceled surgery please advise.

## 2020-02-24 NOTE — TELEPHONE ENCOUNTER
ADAMRJ. I spoke with managed care ( Carlton Todd) and the referral has been done for the facility [facility]).  She will fax over to [facility] at [address] and [address] [phone]

## 2020-02-24 NOTE — TELEPHONE ENCOUNTER
I informed pt that managed care put through a referral for the [facility] [place]) and was faxing both referrals. I am waiting for a call back from [facility] to make sure they have been received.

## 2020-03-17 DIAGNOSIS — Z51.81 ENCOUNTER FOR MEDICATION MONITORING: ICD-10-CM

## 2020-03-17 DIAGNOSIS — D50.0 IRON DEFICIENCY ANEMIA DUE TO CHRONIC BLOOD LOSS: Primary | ICD-10-CM

## 2020-03-19 ENCOUNTER — TELEPHONE (OUTPATIENT)
Dept: OBGYN CLINIC | Facility: CLINIC | Age: 40
End: 2020-03-19

## 2020-03-19 ENCOUNTER — APPOINTMENT (OUTPATIENT)
Dept: HEMATOLOGY/ONCOLOGY | Facility: HOSPITAL | Age: 40
End: 2020-03-19
Attending: INTERNAL MEDICINE
Payer: COMMERCIAL

## 2020-03-23 ENCOUNTER — TELEPHONE (OUTPATIENT)
Dept: OBGYN CLINIC | Facility: CLINIC | Age: 40
End: 2020-03-23

## 2020-03-23 DIAGNOSIS — N63.0 LUMP OR MASS IN BREAST: Primary | ICD-10-CM

## 2020-03-23 NOTE — TELEPHONE ENCOUNTER
----- Message from Shelby Pabon DO sent at 3/23/2020  9:59 AM CDT -----  Needs additional views per Radiology

## 2020-03-25 ENCOUNTER — HOSPITAL ENCOUNTER (OUTPATIENT)
Dept: ULTRASOUND IMAGING | Facility: HOSPITAL | Age: 40
Discharge: HOME | End: 2020-03-25
Attending: OBSTETRICS & GYNECOLOGY
Payer: COMMERCIAL

## 2020-03-25 DIAGNOSIS — N63.0 LUMP OR MASS IN BREAST: ICD-10-CM

## 2020-03-25 PROCEDURE — 76642 ULTRASOUND BREAST LIMITED: CPT | Performed by: OBSTETRICS & GYNECOLOGY

## 2020-05-13 ENCOUNTER — TELEPHONE (OUTPATIENT)
Dept: OBGYN CLINIC | Facility: CLINIC | Age: 40
End: 2020-05-13

## 2020-05-13 NOTE — TELEPHONE ENCOUNTER
LMTCB    ----- Message from Sam Nguyen DO sent at 5/11/2020  1:56 PM CDT -----  Please notify of addended report and recs for repeat mammo in 12 months    Physician Responsible for MQSA Outcome Reason    Luis Jay MD Signed       Addenda

## 2020-06-04 ENCOUNTER — OFFICE VISIT (OUTPATIENT)
Dept: HEMATOLOGY/ONCOLOGY | Facility: HOSPITAL | Age: 40
End: 2020-06-04
Attending: INTERNAL MEDICINE
Payer: COMMERCIAL

## 2020-06-04 VITALS
RESPIRATION RATE: 16 BRPM | DIASTOLIC BLOOD PRESSURE: 53 MMHG | HEIGHT: 67 IN | SYSTOLIC BLOOD PRESSURE: 114 MMHG | OXYGEN SATURATION: 95 % | WEIGHT: 246 LBS | BODY MASS INDEX: 38.61 KG/M2 | TEMPERATURE: 98 F | HEART RATE: 70 BPM

## 2020-06-04 DIAGNOSIS — Z51.81 ENCOUNTER FOR MEDICATION MONITORING: ICD-10-CM

## 2020-06-04 DIAGNOSIS — D50.0 IRON DEFICIENCY ANEMIA DUE TO CHRONIC BLOOD LOSS: ICD-10-CM

## 2020-06-04 DIAGNOSIS — D50.0 IRON DEFICIENCY ANEMIA DUE TO CHRONIC BLOOD LOSS: Primary | ICD-10-CM

## 2020-06-04 PROCEDURE — 99214 OFFICE O/P EST MOD 30 MIN: CPT | Performed by: INTERNAL MEDICINE

## 2020-06-04 PROCEDURE — 85060 BLOOD SMEAR INTERPRETATION: CPT

## 2020-06-04 PROCEDURE — 83540 ASSAY OF IRON: CPT

## 2020-06-04 PROCEDURE — 82728 ASSAY OF FERRITIN: CPT

## 2020-06-04 PROCEDURE — 85025 COMPLETE CBC W/AUTO DIFF WBC: CPT

## 2020-06-04 PROCEDURE — 84466 ASSAY OF TRANSFERRIN: CPT

## 2020-06-04 PROCEDURE — 36415 COLL VENOUS BLD VENIPUNCTURE: CPT

## 2020-06-04 NOTE — PROGRESS NOTES
EDITH     Bradly Brandon is a 37year old female who is here today for f/u of Iron deficiency anemia due to chronic blood loss  (primary encounter diagnosis)  Encounter for medication monitoring      Patient completed 5 doses of 200 mg of Venofer on 8/7 History:   Procedure Laterality Date   •       X 3   • CHOLECYSTECTOMY      2013     Social History    Tobacco Use      Smoking status: Never Smoker      Smokeless tobacco: Never Used    Alcohol use: Yes      Frequency: 2-4 times a month      Nik diagnosed with endometrial cancer and had a KRISTI/BSO in March 20 of 2020. Endometrial cancer was stage I and she is being followed by Dr. Jian Yan. Labs today are pending.   Discussed with the patient that the iron deficiency was secondary to the bleeding

## 2020-06-05 ENCOUNTER — TELEPHONE (OUTPATIENT)
Dept: HEMATOLOGY/ONCOLOGY | Facility: HOSPITAL | Age: 40
End: 2020-06-05

## 2020-06-05 NOTE — TELEPHONE ENCOUNTER
Pt called per Dr. Markham Pitcher request and notified  that her hemoglobin is markedly improved.  She still has iron deficiency, however it is mild.  She can take 1 over-the-counter Slow Fe iron supplement along with a folic acid supplement.  She should come back i

## 2020-06-05 NOTE — PROGRESS NOTES
Please let the patient know that her hemoglobin is markedly improved. She still has iron deficiency, however it is mild. She can take 1 over-the-counter Slow Fe iron supplement along with a folic acid supplement.   She should come back in 3 months with CHANTEL

## 2020-06-11 ENCOUNTER — TELEPHONE (OUTPATIENT)
Dept: OBGYN CLINIC | Facility: CLINIC | Age: 40
End: 2020-06-11

## 2020-06-11 NOTE — TELEPHONE ENCOUNTER
Received consult note dated 3/17/20 from Dr Isabel Ingram placed on [address] [place], [place] [address]k for review.

## 2020-07-07 ENCOUNTER — TELEPHONE (OUTPATIENT)
Dept: INTERNAL MEDICINE CLINIC | Facility: CLINIC | Age: 40
End: 2020-07-07

## 2020-07-07 DIAGNOSIS — Z78.9 WEIGHT LOSS ADVISED: Primary | ICD-10-CM

## 2020-09-08 ENCOUNTER — APPOINTMENT (OUTPATIENT)
Dept: HEMATOLOGY/ONCOLOGY | Facility: HOSPITAL | Age: 40
End: 2020-09-08
Attending: INTERNAL MEDICINE
Payer: COMMERCIAL

## 2020-10-01 ENCOUNTER — APPOINTMENT (OUTPATIENT)
Dept: HEMATOLOGY/ONCOLOGY | Facility: HOSPITAL | Age: 40
End: 2020-10-01
Attending: INTERNAL MEDICINE
Payer: COMMERCIAL

## 2020-10-19 ENCOUNTER — TELEPHONE (OUTPATIENT)
Dept: HEMATOLOGY/ONCOLOGY | Facility: HOSPITAL | Age: 40
End: 2020-10-19

## 2020-10-20 ENCOUNTER — TELEPHONE (OUTPATIENT)
Dept: HEMATOLOGY/ONCOLOGY | Facility: HOSPITAL | Age: 40
End: 2020-10-20

## 2020-11-03 ENCOUNTER — OFFICE VISIT (OUTPATIENT)
Dept: INTERNAL MEDICINE CLINIC | Facility: CLINIC | Age: 40
End: 2020-11-03
Payer: COMMERCIAL

## 2020-11-03 VITALS
DIASTOLIC BLOOD PRESSURE: 74 MMHG | WEIGHT: 265.63 LBS | HEIGHT: 67 IN | SYSTOLIC BLOOD PRESSURE: 118 MMHG | BODY MASS INDEX: 41.69 KG/M2 | HEART RATE: 90 BPM | OXYGEN SATURATION: 93 %

## 2020-11-03 DIAGNOSIS — Z12.31 SCREENING MAMMOGRAM, ENCOUNTER FOR: ICD-10-CM

## 2020-11-03 DIAGNOSIS — Z78.9 WEIGHT LOSS ADVISED: ICD-10-CM

## 2020-11-03 DIAGNOSIS — Z00.00 WELLNESS EXAMINATION: Primary | ICD-10-CM

## 2020-11-03 PROCEDURE — 3008F BODY MASS INDEX DOCD: CPT | Performed by: INTERNAL MEDICINE

## 2020-11-03 PROCEDURE — 3078F DIAST BP <80 MM HG: CPT | Performed by: INTERNAL MEDICINE

## 2020-11-03 PROCEDURE — 99396 PREV VISIT EST AGE 40-64: CPT | Performed by: INTERNAL MEDICINE

## 2020-11-03 PROCEDURE — 3074F SYST BP LT 130 MM HG: CPT | Performed by: INTERNAL MEDICINE

## 2020-11-03 NOTE — PROGRESS NOTES
HPI:    Patient ID: Chayito Adorno is a 41year old female. HPI pt here for a wellness check - had a hysterectomy in 3/20. Had early stage uterine cancer. Has gained almos 20 lbs of weight.     Review of Systems   Constitutional: Positive for unexpe requested or ordered in this encounter       Imaging & Referrals:  None       CN#[id number]

## 2020-11-04 ENCOUNTER — LAB ENCOUNTER (OUTPATIENT)
Dept: LAB | Facility: HOSPITAL | Age: 40
End: 2020-11-04
Attending: INTERNAL MEDICINE
Payer: COMMERCIAL

## 2020-11-04 ENCOUNTER — OFFICE VISIT (OUTPATIENT)
Dept: SURGERY | Facility: CLINIC | Age: 40
End: 2020-11-04
Payer: COMMERCIAL

## 2020-11-04 VITALS
HEIGHT: 66 IN | WEIGHT: 262.69 LBS | BODY MASS INDEX: 42.22 KG/M2 | HEART RATE: 88 BPM | DIASTOLIC BLOOD PRESSURE: 71 MMHG | OXYGEN SATURATION: 97 % | SYSTOLIC BLOOD PRESSURE: 111 MMHG

## 2020-11-04 DIAGNOSIS — Z00.00 WELLNESS EXAMINATION: ICD-10-CM

## 2020-11-04 DIAGNOSIS — D50.0 IRON DEFICIENCY ANEMIA DUE TO CHRONIC BLOOD LOSS: ICD-10-CM

## 2020-11-04 DIAGNOSIS — E66.01 MORBID OBESITY WITH BMI OF 40.0-44.9, ADULT (HCC): ICD-10-CM

## 2020-11-04 DIAGNOSIS — R63.5 WEIGHT GAIN: Primary | ICD-10-CM

## 2020-11-04 DIAGNOSIS — Z51.81 ENCOUNTER FOR MEDICATION MONITORING: ICD-10-CM

## 2020-11-04 DIAGNOSIS — R06.83 SNORING: ICD-10-CM

## 2020-11-04 DIAGNOSIS — Z85.42 HISTORY OF UTERINE CANCER: ICD-10-CM

## 2020-11-04 DIAGNOSIS — F32.A DEPRESSIVE DISORDER: ICD-10-CM

## 2020-11-04 DIAGNOSIS — R63.2 BINGE EATING: ICD-10-CM

## 2020-11-04 PROCEDURE — 83540 ASSAY OF IRON: CPT

## 2020-11-04 PROCEDURE — 36415 COLL VENOUS BLD VENIPUNCTURE: CPT | Performed by: INTERNAL MEDICINE

## 2020-11-04 PROCEDURE — 84443 ASSAY THYROID STIM HORMONE: CPT

## 2020-11-04 PROCEDURE — 3078F DIAST BP <80 MM HG: CPT | Performed by: NURSE PRACTITIONER

## 2020-11-04 PROCEDURE — 80053 COMPREHEN METABOLIC PANEL: CPT | Performed by: INTERNAL MEDICINE

## 2020-11-04 PROCEDURE — 84439 ASSAY OF FREE THYROXINE: CPT

## 2020-11-04 PROCEDURE — 3008F BODY MASS INDEX DOCD: CPT | Performed by: NURSE PRACTITIONER

## 2020-11-04 PROCEDURE — 84466 ASSAY OF TRANSFERRIN: CPT

## 2020-11-04 PROCEDURE — 99204 OFFICE O/P NEW MOD 45 MIN: CPT | Performed by: NURSE PRACTITIONER

## 2020-11-04 PROCEDURE — 3074F SYST BP LT 130 MM HG: CPT | Performed by: NURSE PRACTITIONER

## 2020-11-04 PROCEDURE — 80061 LIPID PANEL: CPT | Performed by: INTERNAL MEDICINE

## 2020-11-04 PROCEDURE — 85025 COMPLETE CBC W/AUTO DIFF WBC: CPT | Performed by: INTERNAL MEDICINE

## 2020-11-04 PROCEDURE — 82728 ASSAY OF FERRITIN: CPT

## 2020-11-04 RX ORDER — LEVOTHYROXINE SODIUM 0.03 MG/1
25 TABLET ORAL
Qty: 30 TABLET | Refills: 11 | Status: SHIPPED | OUTPATIENT
Start: 2020-11-04

## 2020-11-04 NOTE — PROGRESS NOTES
The Wellness and Weight Loss Consultation Note       Date of Consult:  2020    Patient:  Louella Klinefelter  :      1980  MRN:      [id number]    Referring Provider: Dr. Leopold Bunk       Chief Complaint:  Patient presents with:   Follow - Up  William Castano Refill   • Levothyroxine Sodium 25 MCG Oral Tab Take 1 tablet (25 mcg total) by mouth before breakfast. (Patient taking differently: Take 25 mcg by mouth before breakfast. Not yet started ) 30 tablet 11   • Lisdexamfetamine Dimesylate (VYVANSE) 30 MG Oral Fear of current or ex partner: Not on file        Emotionally abused: Not on file        Physically abused: Not on file        Forced sexual activity: Not on file    Other Topics      Concerns:        Caffeine Concern: Not Asked        Exercise: Not Asked intake, Identify triggers for eating and manage cues and Eat slowly and take 20 to 30 minutes to complete each meal      ROS:  Constitutional: positive for fatigue  Respiratory: negative  Cardiovascular: negative  Gastrointestinal: positive for constipatio PROTEIN    Morbid obesity with BMI of 40.0-44.9, adult (HCC)  -     EKG 12-LEAD; Future  -     Lisdexamfetamine Dimesylate (VYVANSE) 30 MG Oral Cap;  Take 1 capsule (30 mg total) by mouth daily.  -     Lisdexamfetamine Dimesylate (VYVANSE) 30 MG Oral Cap; T based, minimally to non-processed diet rich in fruits, vegetables, whole grains and healthy fats, and meats/seafood without hormones, steroids, or antibiotics. Avoid processed, poor quality carbohydrates, refined grains, flour, sugar.     Goals for next foods with labels. IF, 12 hours. I spent 45 minutes of face to face time with patient, 30 minutes of which were spent on nutrition, exercise, sleep, stress, weight loss/behavioral counseling and care coordination. RTC one month.      Nigel Herrera,

## 2020-11-04 NOTE — PATIENT INSTRUCTIONS
Decrease wine intake significantly. Stop juice, replace with actual fruit. Cut meals outside the home in half. Start tracking: Aim for 1800 calories/day. Fooducate foods with labels. Breakfast:  1. Fruit and nuts/seeds.   2. Eggs scr are free after that (ex. greens, peppers, broccoli, cauliflower). Aim for 65-80 g protein per day. Aim for 3-4 servings of healthy fats: olives, fatty fish, olive oil, seeds, nuts, avocado, coconut oil. Start Vyvanse.      Update additional labs an

## 2020-11-19 ENCOUNTER — APPOINTMENT (OUTPATIENT)
Dept: HEMATOLOGY/ONCOLOGY | Facility: HOSPITAL | Age: 40
End: 2020-11-19
Attending: INTERNAL MEDICINE
Payer: COMMERCIAL

## 2020-12-09 ENCOUNTER — TELEMEDICINE (OUTPATIENT)
Dept: SURGERY | Facility: CLINIC | Age: 40
End: 2020-12-09

## 2020-12-09 VITALS — HEIGHT: 66 IN | WEIGHT: 262 LBS | BODY MASS INDEX: 42.11 KG/M2

## 2020-12-09 DIAGNOSIS — R63.5 WEIGHT GAIN: ICD-10-CM

## 2020-12-09 DIAGNOSIS — E66.01 MORBID OBESITY WITH BMI OF 40.0-44.9, ADULT (HCC): ICD-10-CM

## 2020-12-09 DIAGNOSIS — R63.2 BINGE EATING: Primary | ICD-10-CM

## 2020-12-09 DIAGNOSIS — Z51.81 ENCOUNTER FOR THERAPEUTIC DRUG MONITORING: ICD-10-CM

## 2020-12-09 DIAGNOSIS — D50.0 IRON DEFICIENCY ANEMIA DUE TO CHRONIC BLOOD LOSS: ICD-10-CM

## 2020-12-09 PROCEDURE — 99214 OFFICE O/P EST MOD 30 MIN: CPT | Performed by: INTERNAL MEDICINE

## 2020-12-09 PROCEDURE — 3008F BODY MASS INDEX DOCD: CPT | Performed by: INTERNAL MEDICINE

## 2020-12-09 NOTE — PROGRESS NOTES
[address] [facility] [address]  [address] [place] [address]  Dept: [phone]       Patient:  Bradly Brandon  :      1980  MRN:      [id number]    Chief Complaint:  Patient pre Needs      Financial resource strain: Not on file      Food insecurity        Worry: Not on file        Inability: Not on file      Transportation needs        Medical: Not on file        Non-medical: Not on file    Tobacco Use      Smoking status: Never S daily    Eating Habits  · Patient states the following:  · Eats 2 meal(s) per day  · Length of time it takes to consume a meal:  20  · # of snacks per day: 1 Type of snacks:  Dried fruit  · Amount of soda consumption per day:    · Amount of water (in ounce day.      Will refill Vyvanse  Needs ekg    Should take iron supplement     Needs to increase activity    Follow up with Yanira Reina    Diagnoses and all orders for this visit:    Binge eating    Weight gain    Morbid obesity with BMI of 40.0-44.9, adult ([facility] [place] [address]

## 2021-02-24 ENCOUNTER — LAB ENCOUNTER (OUTPATIENT)
Dept: LAB | Facility: HOSPITAL | Age: 41
End: 2021-02-24
Attending: CLINICAL NURSE SPECIALIST
Payer: COMMERCIAL

## 2021-02-24 ENCOUNTER — OFFICE VISIT (OUTPATIENT)
Dept: OBGYN CLINIC | Facility: CLINIC | Age: 41
End: 2021-02-24
Payer: COMMERCIAL

## 2021-02-24 VITALS
BODY MASS INDEX: 42 KG/M2 | WEIGHT: 259 LBS | DIASTOLIC BLOOD PRESSURE: 75 MMHG | HEART RATE: 98 BPM | SYSTOLIC BLOOD PRESSURE: 116 MMHG

## 2021-02-24 DIAGNOSIS — Z85.42 HISTORY OF UTERINE CANCER: ICD-10-CM

## 2021-02-24 DIAGNOSIS — R63.5 WEIGHT GAIN: ICD-10-CM

## 2021-02-24 DIAGNOSIS — Z13.9 ENCOUNTER FOR HEALTH-RELATED SCREENING: ICD-10-CM

## 2021-02-24 DIAGNOSIS — R06.83 SNORING: ICD-10-CM

## 2021-02-24 DIAGNOSIS — Z01.419 WELL WOMAN EXAM WITH ROUTINE GYNECOLOGICAL EXAM: Primary | ICD-10-CM

## 2021-02-24 DIAGNOSIS — E66.01 MORBID OBESITY WITH BMI OF 40.0-44.9, ADULT (HCC): ICD-10-CM

## 2021-02-24 DIAGNOSIS — Z85.42 HISTORY OF ENDOMETRIAL CANCER: ICD-10-CM

## 2021-02-24 DIAGNOSIS — F32.A DEPRESSIVE DISORDER: ICD-10-CM

## 2021-02-24 DIAGNOSIS — R63.2 BINGE EATING: ICD-10-CM

## 2021-02-24 DIAGNOSIS — D50.0 IRON DEFICIENCY ANEMIA DUE TO CHRONIC BLOOD LOSS: ICD-10-CM

## 2021-02-24 DIAGNOSIS — Z11.3 SCREENING FOR STD (SEXUALLY TRANSMITTED DISEASE): ICD-10-CM

## 2021-02-24 LAB
HBV SURFACE AG SER-ACNC: <0.1 [IU]/L
HBV SURFACE AG SERPL QL IA: NONREACTIVE
HCV AB SERPL QL IA: NONREACTIVE

## 2021-02-24 PROCEDURE — 36415 COLL VENOUS BLD VENIPUNCTURE: CPT | Performed by: CLINICAL NURSE SPECIALIST

## 2021-02-24 PROCEDURE — 87389 HIV-1 AG W/HIV-1&-2 AB AG IA: CPT | Performed by: CLINICAL NURSE SPECIALIST

## 2021-02-24 PROCEDURE — 3078F DIAST BP <80 MM HG: CPT | Performed by: CLINICAL NURSE SPECIALIST

## 2021-02-24 PROCEDURE — 85060 BLOOD SMEAR INTERPRETATION: CPT | Performed by: CLINICAL NURSE SPECIALIST

## 2021-02-24 PROCEDURE — 3074F SYST BP LT 130 MM HG: CPT | Performed by: CLINICAL NURSE SPECIALIST

## 2021-02-24 PROCEDURE — 85025 COMPLETE CBC W/AUTO DIFF WBC: CPT | Performed by: CLINICAL NURSE SPECIALIST

## 2021-02-24 PROCEDURE — 93010 ELECTROCARDIOGRAM REPORT: CPT | Performed by: NURSE PRACTITIONER

## 2021-02-24 PROCEDURE — 93005 ELECTROCARDIOGRAM TRACING: CPT

## 2021-02-24 PROCEDURE — 86780 TREPONEMA PALLIDUM: CPT | Performed by: CLINICAL NURSE SPECIALIST

## 2021-02-24 PROCEDURE — 99396 PREV VISIT EST AGE 40-64: CPT | Performed by: CLINICAL NURSE SPECIALIST

## 2021-02-24 PROCEDURE — 86803 HEPATITIS C AB TEST: CPT | Performed by: CLINICAL NURSE SPECIALIST

## 2021-02-24 PROCEDURE — 87340 HEPATITIS B SURFACE AG IA: CPT | Performed by: CLINICAL NURSE SPECIALIST

## 2021-02-24 RX ORDER — ACETAMINOPHEN 325 MG/1
650 TABLET ORAL EVERY 6 HOURS
COMMUNITY
Start: 2021-02-11 | End: 2021-02-24

## 2021-02-24 RX ORDER — LISDEXAMFETAMINE DIMESYLATE 30 MG/1
CAPSULE ORAL
COMMUNITY
Start: 2021-01-18 | End: 2021-04-01

## 2021-02-25 LAB
BASOPHILS # BLD AUTO: 0.07 X10(3) UL (ref 0–0.2)
BASOPHILS NFR BLD AUTO: 0.8 %
C TRACH DNA SPEC QL NAA+PROBE: NEGATIVE
DEPRECATED RDW RBC AUTO: 40.7 FL (ref 35.1–46.3)
EOSINOPHIL # BLD AUTO: 0.2 X10(3) UL (ref 0–0.7)
EOSINOPHIL NFR BLD AUTO: 2.4 %
ERYTHROCYTE [DISTWIDTH] IN BLOOD BY AUTOMATED COUNT: 14.5 % (ref 11–15)
HCT VFR BLD AUTO: 43.7 %
HGB BLD-MCNC: 13.6 G/DL
IMM GRANULOCYTES # BLD AUTO: 0.02 X10(3) UL (ref 0–1)
IMM GRANULOCYTES NFR BLD: 0.2 %
LYMPHOCYTES # BLD AUTO: 2.11 X10(3) UL (ref 1–4)
LYMPHOCYTES NFR BLD AUTO: 25.5 %
MCH RBC QN AUTO: 24.2 PG (ref 26–34)
MCHC RBC AUTO-ENTMCNC: 31.1 G/DL (ref 31–37)
MCV RBC AUTO: 77.8 FL
MONOCYTES # BLD AUTO: 0.5 X10(3) UL (ref 0.1–1)
MONOCYTES NFR BLD AUTO: 6 %
N GONORRHOEA DNA SPEC QL NAA+PROBE: NEGATIVE
NEUTROPHILS # BLD AUTO: 5.39 X10 (3) UL (ref 1.5–7.7)
NEUTROPHILS # BLD AUTO: 5.39 X10(3) UL (ref 1.5–7.7)
NEUTROPHILS NFR BLD AUTO: 65.1 %
PLATELET # BLD AUTO: 275 10(3)UL (ref 150–450)
RBC # BLD AUTO: 5.62 X10(6)UL
WBC # BLD AUTO: 8.3 X10(3) UL (ref 4–11)

## 2021-02-25 NOTE — PROGRESS NOTES
Ting Mendez is a 41year old female  Patient's last menstrual period was 2020. Patient presents with:  Gyn Exam: annual  Last annual exam and pap was 2019.  Pap was QUEENIE with follow up colpo being normal but EMB showing stage 1 endometria file    Tobacco Use      Smoking status: Never Smoker      Smokeless tobacco: Never Used    Substance and Sexual Activity      Alcohol use: Yes        Frequency: 2-4 times a month        Drinks per session: 1 or 2        Binge frequency: Never      Drug us palpitations  Respiratory:  denies shortness of breath  Gastrointestinal:  denies heartburn, abdominal pain, diarrhea or constipation  Genitourinary:  denies dysuria, incontinence, abnormal vaginal discharge, vaginal itching  Musculoskeletal:  denies back COMBO  -     HEPATITIS B SURFACE ANTIGEN  -     HCV ANTIBODY  -     T PALLIDUM SCREENING CASCADE  -     CHLAMYDIA/GONOCOCCUS, SHANELL  -     TRICH VAG BY SHANELL    Encounter for health-related screening  -     CBC WITH DIFFERENTIAL WITH PLATELET  -     MD BLOOD S

## 2021-02-26 LAB — T VAGINALIS RRNA SPEC QL NAA+PROBE: NEGATIVE

## 2021-02-27 LAB — T PALLIDUM AB SER QL: NEGATIVE

## 2021-03-02 NOTE — TELEPHONE ENCOUNTER
Needs referral for up coming surgery 2/24 with dr Tutu Horner   Fax to office at [phone] Normal mammogram

## 2021-03-09 ENCOUNTER — HOSPITAL ENCOUNTER (OUTPATIENT)
Dept: MAMMOGRAPHY | Facility: HOSPITAL | Age: 41
Discharge: HOME | End: 2021-03-09
Attending: INTERNAL MEDICINE
Payer: COMMERCIAL

## 2021-03-09 DIAGNOSIS — Z12.31 SCREENING MAMMOGRAM, ENCOUNTER FOR: ICD-10-CM

## 2021-03-09 PROCEDURE — 77063 BREAST TOMOSYNTHESIS BI: CPT | Performed by: INTERNAL MEDICINE

## 2021-03-09 PROCEDURE — 77067 SCR MAMMO BI INCL CAD: CPT | Performed by: INTERNAL MEDICINE

## 2021-03-11 ENCOUNTER — TELEPHONE (OUTPATIENT)
Dept: SURGERY | Facility: CLINIC | Age: 41
End: 2021-03-11

## 2021-04-01 ENCOUNTER — TELEPHONE (OUTPATIENT)
Dept: SURGERY | Facility: CLINIC | Age: 41
End: 2021-04-01

## 2021-04-01 NOTE — TELEPHONE ENCOUNTER
Spoke to patient re: abnormal EKG result. Explained there are abnormal changes on her updated EKG, however no changes noted since 2015. Patient denies sig family hx cardiac disease or current abnormal cardiac symptoms.      Explained further work up req

## 2021-04-14 ENCOUNTER — IMMUNIZATION (OUTPATIENT)
Dept: LAB | Age: 41
End: 2021-04-14
Attending: HOSPITALIST
Payer: COMMERCIAL

## 2021-04-14 DIAGNOSIS — Z23 NEED FOR VACCINATION: Primary | ICD-10-CM

## 2021-04-14 PROCEDURE — 0001A SARSCOV2 VAC 30MCG/0.3ML IM: CPT

## 2021-05-05 ENCOUNTER — IMMUNIZATION (OUTPATIENT)
Dept: LAB | Age: 41
End: 2021-05-05
Attending: HOSPITALIST
Payer: COMMERCIAL

## 2021-05-05 DIAGNOSIS — Z23 NEED FOR VACCINATION: Primary | ICD-10-CM

## 2021-05-05 PROCEDURE — 0002A SARSCOV2 VAC 30MCG/0.3ML IM: CPT

## 2022-04-07 ENCOUNTER — OFFICE VISIT (OUTPATIENT)
Dept: FAMILY MEDICINE CLINIC | Facility: CLINIC | Age: 42
End: 2022-04-07

## 2022-04-07 ENCOUNTER — HOSPITAL ENCOUNTER (EMERGENCY)
Facility: HOSPITAL | Age: 42
Discharge: HOME | End: 2022-04-07
Attending: EMERGENCY MEDICINE
Payer: COMMERCIAL

## 2022-04-07 VITALS
WEIGHT: 250 LBS | TEMPERATURE: 98 F | BODY MASS INDEX: 40 KG/M2 | DIASTOLIC BLOOD PRESSURE: 68 MMHG | SYSTOLIC BLOOD PRESSURE: 122 MMHG | RESPIRATION RATE: 18 BRPM | HEART RATE: 76 BPM | OXYGEN SATURATION: 100 %

## 2022-04-07 DIAGNOSIS — Z02.9 ENCOUNTER FOR ADMINISTRATIVE EXAMINATIONS, UNSPECIFIED: Primary | ICD-10-CM

## 2022-04-07 DIAGNOSIS — L72.9 SCALP CYST: ICD-10-CM

## 2022-04-07 DIAGNOSIS — R22.0 SCALP MASS: Primary | ICD-10-CM

## 2022-04-07 PROCEDURE — 99283 EMERGENCY DEPT VISIT LOW MDM: CPT

## 2022-04-07 RX ORDER — DOXYCYCLINE HYCLATE 100 MG/1
100 CAPSULE ORAL 2 TIMES DAILY
Qty: 14 CAPSULE | Refills: 0 | Status: SHIPPED | OUTPATIENT
Start: 2022-04-07 | End: 2022-04-14

## 2022-04-07 NOTE — PROGRESS NOTES
Pt reports has had a bump on her head for sometime. She has seen Dr. Augustine Kilgore for this in the past. Would like it to be possibly removed. Discussed limitations of [place]. Offered to evaluate in clinic, pt elected to follow up with PCP. Pt will follow up today with PCP for possible referral to dermatology. No charge.  Appt scheduled for today at 4pm with dr. Stephen Marie

## 2022-06-21 ENCOUNTER — OFFICE VISIT (OUTPATIENT)
Dept: INTERNAL MEDICINE CLINIC | Facility: CLINIC | Age: 42
End: 2022-06-21
Payer: COMMERCIAL

## 2022-06-21 VITALS
SYSTOLIC BLOOD PRESSURE: 118 MMHG | OXYGEN SATURATION: 96 % | HEIGHT: 66 IN | BODY MASS INDEX: 43.07 KG/M2 | DIASTOLIC BLOOD PRESSURE: 78 MMHG | WEIGHT: 268 LBS | HEART RATE: 98 BPM

## 2022-06-21 DIAGNOSIS — D50.0 IRON DEFICIENCY ANEMIA DUE TO CHRONIC BLOOD LOSS: ICD-10-CM

## 2022-06-21 DIAGNOSIS — Z80.3 FAMILY HISTORY OF BREAST CANCER: ICD-10-CM

## 2022-06-21 DIAGNOSIS — E66.01 MORBID OBESITY WITH BMI OF 40.0-44.9, ADULT (HCC): ICD-10-CM

## 2022-06-21 DIAGNOSIS — F32.A DEPRESSIVE DISORDER: ICD-10-CM

## 2022-06-21 DIAGNOSIS — Z00.00 HEALTH MAINTENANCE EXAMINATION: Primary | ICD-10-CM

## 2022-06-21 PROBLEM — R63.5 WEIGHT GAIN: Status: RESOLVED | Noted: 2020-11-04 | Resolved: 2022-06-21

## 2022-06-21 PROBLEM — Z51.81 ENCOUNTER FOR THERAPEUTIC DRUG MONITORING: Status: RESOLVED | Noted: 2019-10-23 | Resolved: 2022-06-21

## 2022-06-21 LAB
ALBUMIN SERPL-MCNC: 4.3 G/DL (ref 3.4–5)
ALBUMIN/GLOB SERPL: 1 {RATIO} (ref 1–2)
ALP LIVER SERPL-CCNC: 65 U/L
ALT SERPL-CCNC: 20 U/L
ANION GAP SERPL CALC-SCNC: 10 MMOL/L (ref 0–18)
AST SERPL-CCNC: 13 U/L (ref 15–37)
BASOPHILS # BLD AUTO: 0.07 X10(3) UL (ref 0–0.2)
BASOPHILS NFR BLD AUTO: 0.8 %
BILIRUB SERPL-MCNC: 0.6 MG/DL (ref 0.1–2)
BUN BLD-MCNC: 7 MG/DL (ref 7–18)
BUN/CREAT SERPL: 8.4 (ref 10–20)
CALCIUM BLD-MCNC: 9.4 MG/DL (ref 8.5–10.1)
CHLORIDE SERPL-SCNC: 105 MMOL/L (ref 98–112)
CHOLEST SERPL-MCNC: 167 MG/DL (ref ?–200)
CO2 SERPL-SCNC: 26 MMOL/L (ref 21–32)
CREAT BLD-MCNC: 0.83 MG/DL
DEPRECATED HBV CORE AB SER IA-ACNC: 48.6 NG/ML
DEPRECATED RDW RBC AUTO: 39.5 FL (ref 35.1–46.3)
EOSINOPHIL # BLD AUTO: 0.33 X10(3) UL (ref 0–0.7)
EOSINOPHIL NFR BLD AUTO: 3.8 %
ERYTHROCYTE [DISTWIDTH] IN BLOOD BY AUTOMATED COUNT: 13.6 % (ref 11–15)
EST. AVERAGE GLUCOSE BLD GHB EST-MCNC: 120 MG/DL (ref 68–126)
FASTING PATIENT LIPID ANSWER: NO
FASTING STATUS PATIENT QL REPORTED: NO
GLOBULIN PLAS-MCNC: 4.1 G/DL (ref 2.8–4.4)
GLUCOSE BLD-MCNC: 99 MG/DL (ref 70–99)
HBA1C MFR BLD: 5.8 % (ref ?–5.7)
HCT VFR BLD AUTO: 44.7 %
HCV AB SERPL QL IA: NONREACTIVE
HDLC SERPL-MCNC: 35 MG/DL (ref 40–59)
HGB BLD-MCNC: 13.7 G/DL
IMM GRANULOCYTES # BLD AUTO: 0.02 X10(3) UL (ref 0–1)
IMM GRANULOCYTES NFR BLD: 0.2 %
IRON SATN MFR SERPL: 15 %
IRON SERPL-MCNC: 62 UG/DL
LDLC SERPL CALC-MCNC: 109 MG/DL (ref ?–100)
LYMPHOCYTES # BLD AUTO: 2.32 X10(3) UL (ref 1–4)
LYMPHOCYTES NFR BLD AUTO: 26.7 %
MCH RBC QN AUTO: 24.5 PG (ref 26–34)
MCHC RBC AUTO-ENTMCNC: 30.6 G/DL (ref 31–37)
MCV RBC AUTO: 80 FL
MONOCYTES # BLD AUTO: 0.44 X10(3) UL (ref 0.1–1)
MONOCYTES NFR BLD AUTO: 5.1 %
NEUTROPHILS # BLD AUTO: 5.52 X10 (3) UL (ref 1.5–7.7)
NEUTROPHILS # BLD AUTO: 5.52 X10(3) UL (ref 1.5–7.7)
NEUTROPHILS NFR BLD AUTO: 63.4 %
NONHDLC SERPL-MCNC: 132 MG/DL (ref ?–130)
OSMOLALITY SERPL CALC.SUM OF ELEC: 290 MOSM/KG (ref 275–295)
PLATELET # BLD AUTO: 272 10(3)UL (ref 150–450)
POTASSIUM SERPL-SCNC: 3.7 MMOL/L (ref 3.5–5.1)
PROT SERPL-MCNC: 8.4 G/DL (ref 6.4–8.2)
RBC # BLD AUTO: 5.59 X10(6)UL
SODIUM SERPL-SCNC: 141 MMOL/L (ref 136–145)
TIBC SERPL-MCNC: 420 UG/DL (ref 240–450)
TRANSFERRIN SERPL-MCNC: 282 MG/DL (ref 200–360)
TRIGL SERPL-MCNC: 127 MG/DL (ref 30–149)
VLDLC SERPL CALC-MCNC: 22 MG/DL (ref 0–30)
WBC # BLD AUTO: 8.7 X10(3) UL (ref 4–11)

## 2022-06-21 PROCEDURE — 86803 HEPATITIS C AB TEST: CPT | Performed by: FAMILY MEDICINE

## 2022-06-21 PROCEDURE — 83021 HEMOGLOBIN CHROMOTOGRAPHY: CPT | Performed by: FAMILY MEDICINE

## 2022-06-21 PROCEDURE — 3078F DIAST BP <80 MM HG: CPT | Performed by: FAMILY MEDICINE

## 2022-06-21 PROCEDURE — 83036 HEMOGLOBIN GLYCOSYLATED A1C: CPT | Performed by: FAMILY MEDICINE

## 2022-06-21 PROCEDURE — 90471 IMMUNIZATION ADMIN: CPT | Performed by: FAMILY MEDICINE

## 2022-06-21 PROCEDURE — 3008F BODY MASS INDEX DOCD: CPT | Performed by: FAMILY MEDICINE

## 2022-06-21 PROCEDURE — 83020 HEMOGLOBIN ELECTROPHORESIS: CPT | Performed by: FAMILY MEDICINE

## 2022-06-21 PROCEDURE — 85025 COMPLETE CBC W/AUTO DIFF WBC: CPT | Performed by: FAMILY MEDICINE

## 2022-06-21 PROCEDURE — 85060 BLOOD SMEAR INTERPRETATION: CPT | Performed by: FAMILY MEDICINE

## 2022-06-21 PROCEDURE — 83540 ASSAY OF IRON: CPT | Performed by: FAMILY MEDICINE

## 2022-06-21 PROCEDURE — 84466 ASSAY OF TRANSFERRIN: CPT | Performed by: FAMILY MEDICINE

## 2022-06-21 PROCEDURE — 82728 ASSAY OF FERRITIN: CPT | Performed by: FAMILY MEDICINE

## 2022-06-21 PROCEDURE — 3074F SYST BP LT 130 MM HG: CPT | Performed by: FAMILY MEDICINE

## 2022-06-21 PROCEDURE — 99396 PREV VISIT EST AGE 40-64: CPT | Performed by: FAMILY MEDICINE

## 2022-06-21 PROCEDURE — 80061 LIPID PANEL: CPT | Performed by: FAMILY MEDICINE

## 2022-06-21 PROCEDURE — 90715 TDAP VACCINE 7 YRS/> IM: CPT | Performed by: FAMILY MEDICINE

## 2022-06-21 PROCEDURE — 80053 COMPREHEN METABOLIC PANEL: CPT | Performed by: FAMILY MEDICINE

## 2022-06-21 NOTE — ASSESSMENT & PLAN NOTE
History of iron deficiency anemia. Line check CBC, iron, ferritin. Also check hemoglobin electrophoresis.

## 2022-06-21 NOTE — ASSESSMENT & PLAN NOTE
Exercise and diet advised. CBC, CMP, lipid panel, Hba1c  Tdap today. Advanced directive information provided. Advised COVID vaccine booster. Mammogram ordered and scheduled.

## 2022-06-21 NOTE — ASSESSMENT & PLAN NOTE
Patient reports history of anxiety and depression, currently controlled. Advised to follow-up if with worsening symptoms.

## 2022-06-22 PROBLEM — E78.5 DYSLIPIDEMIA (HIGH LDL; LOW HDL): Status: ACTIVE | Noted: 2022-06-22

## 2022-06-22 PROBLEM — R73.03 PREDIABETES: Status: ACTIVE | Noted: 2022-06-22

## 2022-06-22 LAB
HGB A2 MFR BLD: 2.2 % (ref 1.5–3.5)
HGB PNL BLD ELPH: 97.8 % (ref 95.5–100)

## 2022-06-25 ENCOUNTER — HOSPITAL ENCOUNTER (OUTPATIENT)
Dept: MAMMOGRAPHY | Facility: HOSPITAL | Age: 42
Discharge: HOME | End: 2022-06-25
Attending: FAMILY MEDICINE
Payer: COMMERCIAL

## 2022-06-25 DIAGNOSIS — Z80.3 FAMILY HISTORY OF BREAST CANCER: ICD-10-CM

## 2022-06-25 DIAGNOSIS — Z00.00 HEALTH MAINTENANCE EXAMINATION: ICD-10-CM

## 2022-06-25 PROCEDURE — 77063 BREAST TOMOSYNTHESIS BI: CPT | Performed by: FAMILY MEDICINE

## 2022-06-25 PROCEDURE — 77067 SCR MAMMO BI INCL CAD: CPT | Performed by: FAMILY MEDICINE

## 2022-06-30 ENCOUNTER — OFFICE VISIT (OUTPATIENT)
Dept: DERMATOLOGY CLINIC | Facility: CLINIC | Age: 42
End: 2022-06-30
Payer: COMMERCIAL

## 2022-06-30 DIAGNOSIS — L72.9 CYST OF SKIN: Primary | ICD-10-CM

## 2022-06-30 PROCEDURE — 99203 OFFICE O/P NEW LOW 30 MIN: CPT | Performed by: PHYSICIAN ASSISTANT

## 2022-06-30 NOTE — PROGRESS NOTES
HPI:    Patient ID: Ashely Zhu is a 35year old female. Patient presents with a cyst on left side and back of head. She did got to the ER and had it drained about 3 months ago. She was given antibiotics at the time. She has noticed it was small for many years and this was the first this year that it filled and drained. She does get pulsations from the lesion at times as well. She does get radiation of pain as well. No draining now. No tenderness, warmth or swelling noted at this time. No allergies to medications noted. Review of Systems   Constitutional: Negative for chills and fever. Musculoskeletal: Negative for arthralgias and myalgias. Skin: Positive for rash. Negative for color change and wound. No current outpatient medications on file. Allergies:No Known Allergies   LMP 01/17/2020   There is no height or weight on file to calculate BMI. PHYSICAL EXAM:   Physical Exam  Constitutional:       General: She is not in acute distress. Appearance: Normal appearance. Skin:     General: Skin is warm and dry. Findings: Rash present. Comments: Cyst noted on the back of left head. No draining. No tenderness. Fluctuant and mobile. Neurological:      Mental Status: She is alert and oriented to person, place, and time. ASSESSMENT/PLAN:   1. Cyst of skin  -After discussion with patient, advised the following:  -Told to see ENT  -Referral placed  -Given radiation of pain and location of cyst should be seen by ENT first   -To call or follow-up with worsening or concerns.   -Pt was agreeable to plan and will comply with discussion above. - ENT - INTERNAL      No orders of the defined types were placed in this encounter.       Meds This Visit:  Requested Prescriptions      No prescriptions requested or ordered in this encounter       Imaging & Referrals:  ENT - INTERNAL         MP#[id number]

## 2022-09-08 ENCOUNTER — OFFICE VISIT (OUTPATIENT)
Dept: OTOLARYNGOLOGY | Facility: CLINIC | Age: 42
End: 2022-09-08
Payer: MEDICAID

## 2022-09-08 VITALS — WEIGHT: 268 LBS | HEIGHT: 66 IN | BODY MASS INDEX: 43.07 KG/M2 | TEMPERATURE: 98 F

## 2022-09-08 DIAGNOSIS — L72.9 SCALP CYST: Primary | ICD-10-CM

## 2022-09-08 PROCEDURE — 3008F BODY MASS INDEX DOCD: CPT | Performed by: OTOLARYNGOLOGY

## 2022-09-08 PROCEDURE — 99243 OFF/OP CNSLTJ NEW/EST LOW 30: CPT | Performed by: OTOLARYNGOLOGY

## 2022-09-09 ENCOUNTER — TELEPHONE (OUTPATIENT)
Dept: HEMATOLOGY/ONCOLOGY | Facility: HOSPITAL | Age: 42
End: 2022-09-09

## 2022-09-09 NOTE — TELEPHONE ENCOUNTER
Called patient to cancel appointment she should be seeing high risk breast cancer clinic and not consult with Dr. Alyssa Rios. Let her know we are canceling her appointment on Monday and the breast navigator will call on Monday to schedule her with the appropriate provider. She stated understanding.

## 2022-09-12 ENCOUNTER — APPOINTMENT (OUTPATIENT)
Dept: HEMATOLOGY/ONCOLOGY | Facility: HOSPITAL | Age: 42
End: 2022-09-12
Attending: INTERNAL MEDICINE
Payer: MEDICAID

## 2022-09-12 NOTE — TELEPHONE ENCOUNTER
Phoned patient to discuss scheduling appointment with genetic counselor. Appointment scheduled for Thursday 9/15/22 at 1pm with Medio. Patient acknowledged and denied questions.

## 2022-09-15 ENCOUNTER — APPOINTMENT (OUTPATIENT)
Dept: HEMATOLOGY/ONCOLOGY | Facility: HOSPITAL | Age: 42
End: 2022-09-15
Attending: GENETIC COUNSELOR, MS
Payer: MEDICAID

## 2022-09-15 ENCOUNTER — APPOINTMENT (OUTPATIENT)
Dept: GENETICS | Facility: HOSPITAL | Age: 42
End: 2022-09-15
Attending: GENETIC COUNSELOR, MS
Payer: MEDICAID

## 2022-09-15 DIAGNOSIS — L72.9 SCALP CYST: Primary | ICD-10-CM

## 2022-09-20 ENCOUNTER — NURSE ONLY (OUTPATIENT)
Dept: HEMATOLOGY/ONCOLOGY | Facility: HOSPITAL | Age: 42
End: 2022-09-20
Attending: GENETIC COUNSELOR, MS

## 2022-09-20 ENCOUNTER — GENETICS ENCOUNTER (OUTPATIENT)
Dept: GENETICS | Facility: HOSPITAL | Age: 42
End: 2022-09-20
Attending: GENETIC COUNSELOR, MS

## 2022-09-20 DIAGNOSIS — Z85.42 HISTORY OF UTERINE CANCER: Primary | ICD-10-CM

## 2022-09-20 DIAGNOSIS — Z80.3 FAMILY HISTORY OF BREAST CANCER: ICD-10-CM

## 2022-09-20 PROCEDURE — 96040 HC GENETIC COUNSELING EA 30 MIN: CPT | Performed by: GENETIC COUNSELOR, MS

## 2022-09-20 PROCEDURE — 36415 COLL VENOUS BLD VENIPUNCTURE: CPT

## 2022-09-21 VITALS — WEIGHT: 250 LBS | BODY MASS INDEX: 40.18 KG/M2 | HEIGHT: 66 IN

## 2022-09-21 RX ORDER — GARLIC EXTRACT 500 MG
1 CAPSULE ORAL DAILY
COMMUNITY

## 2022-09-23 DIAGNOSIS — L72.9 SCALP CYST: Primary | ICD-10-CM

## 2022-09-26 ENCOUNTER — LAB ENCOUNTER (OUTPATIENT)
Dept: LAB | Facility: HOSPITAL | Age: 42
End: 2022-09-26
Attending: OTOLARYNGOLOGY

## 2022-09-26 DIAGNOSIS — Z01.818 PREOP TESTING: ICD-10-CM

## 2022-09-26 LAB — SARS-COV-2 RNA RESP QL NAA+PROBE: NOT DETECTED

## 2022-09-27 ENCOUNTER — TELEPHONE (OUTPATIENT)
Dept: OTOLARYNGOLOGY | Facility: CLINIC | Age: 42
End: 2022-09-27

## 2022-09-28 ENCOUNTER — HOSPITAL ENCOUNTER (OUTPATIENT)
Facility: HOSPITAL | Age: 42
Setting detail: HOSPITAL OUTPATIENT SURGERY
Discharge: HOME | End: 2022-09-28
Attending: OTOLARYNGOLOGY | Admitting: OTOLARYNGOLOGY
Payer: MEDICAID

## 2022-09-28 ENCOUNTER — TELEPHONE (OUTPATIENT)
Dept: GENETICS | Facility: HOSPITAL | Age: 42
End: 2022-09-28

## 2022-09-28 DIAGNOSIS — Z01.818 PREOP TESTING: Primary | ICD-10-CM

## 2022-09-28 DIAGNOSIS — L72.9 SCALP CYST: ICD-10-CM

## 2022-09-28 PROCEDURE — 11422 EXC H-F-NK-SP B9+MARG 1.1-2: CPT | Performed by: OTOLARYNGOLOGY

## 2022-09-28 PROCEDURE — 12032 INTMD RPR S/A/T/EXT 2.6-7.5: CPT | Performed by: OTOLARYNGOLOGY

## 2022-09-28 PROCEDURE — 0JB00ZX EXCISION OF SCALP SUBCUTANEOUS TISSUE AND FASCIA, OPEN APPROACH, DIAGNOSTIC: ICD-10-PCS | Performed by: OTOLARYNGOLOGY

## 2022-09-28 RX ORDER — HYDROCODONE BITARTRATE AND ACETAMINOPHEN 5; 325 MG/1; MG/1
1 TABLET ORAL EVERY 6 HOURS PRN
Qty: 15 TABLET | Refills: 0 | Status: SHIPPED | OUTPATIENT
Start: 2022-09-28

## 2022-09-28 RX ORDER — CEPHALEXIN 500 MG/1
500 CAPSULE ORAL EVERY 8 HOURS
Qty: 21 CAPSULE | Refills: 0 | Status: SHIPPED | OUTPATIENT
Start: 2022-09-28

## 2022-09-28 RX ORDER — LIDOCAINE HYDROCHLORIDE AND EPINEPHRINE 10; 10 MG/ML; UG/ML
INJECTION, SOLUTION INFILTRATION; PERINEURAL AS NEEDED
Status: DISCONTINUED | OUTPATIENT
Start: 2022-09-28 | End: 2022-09-28 | Stop reason: HOSPADM

## 2022-09-28 NOTE — INTERVAL H&P NOTE
Pre-op Diagnosis: Scalp cyst [L72.9]    The above referenced H&P was reviewed by Pato Prince. Apolonia Baptiste MD on 9/28/2022, the patient was examined and no significant changes have occurred in the patient's condition since the H&P was performed. I discussed with the patient and/or legal representative the potential benefits, risks and side effects of this procedure; the likelihood of the patient achieving goals; and potential problems that might occur during recuperation. I discussed reasonable alternatives to the procedure, including risks, benefits and side effects related to the alternatives and risks related to not receiving this procedure. We will proceed with procedure as planned.

## 2022-09-28 NOTE — TELEPHONE ENCOUNTER
Reported NEGATIVE genetic testing results to [facility] (1-RH). She had opted for 47 gene panel though [facility] ([facility] Common Hereditary Cancers Panel) and all genes tested yielded negative results. Reminded her that given unknown etiology of cancers in family, she should still follow high risk breast cancer surveillance. Released results to her through lab's portal and will mail her a copy as well. All her questions were answered to the best of my ability and she was appreciative of the call.

## 2022-09-28 NOTE — BRIEF OP NOTE
Pre-Operative Diagnosis: Scalp cyst [L72.9]     Post-Operative Diagnosis: Scalp cyst [L72.9]      Procedure Performed:   Excision and closure of scalp cyst    Surgeon(s) and Role:     * Ibis Aiken MD - Primary          Estimated Blood Loss:5 ml        Argenis Quiros.  Keegan Ernst MD  9/28/2022  9:51 AM

## 2022-09-29 ENCOUNTER — TELEPHONE (OUTPATIENT)
Dept: OTOLARYNGOLOGY | Facility: CLINIC | Age: 42
End: 2022-09-29

## 2022-09-29 NOTE — TELEPHONE ENCOUNTER
Pt is post op day 1 left scalp incision and closure, per pt doing well no bleeding or drainage. Pt taking antibiotic as prescribed. Advised pt to contact our office if any changes  (fever, drainage or bleeding from incision). Pt verbalized understanding.     Future Appointments   Date Time Provider Dahlia Recinos   10/6/2022  2:10 PM Irina Mclaughlin MD [address] [facility]

## 2022-09-29 NOTE — OPERATIVE REPORT
[facility]    PATIENT'S NAME: Sary Evans   ATTENDING PHYSICIAN: Rian De Dios MD   OPERATING PHYSICIAN: Rian Preston. Karely De Dios MD   PATIENT ACCOUNT#:   [id number]    LOCATION:  [facility] [address] [facility] 10  MEDICAL RECORD #:   [id number]       YOB: 1980  ADMISSION DATE:       09/28/2022      OPERATION DATE:  09/28/2022    OPERATIVE REPORT      PREOPERATIVE DIAGNOSIS:  Scalp cyst on the left. POSTOPERATIVE DIAGNOSIS:  Scalp cyst on the left. PROCEDURE:  Excision of 1.5 cm scalp cyst with intermediate layered closure measuring 3.5 cm. ANESTHESIA:  Xylocaine 1% with 1:100,000 of epinephrine. ESTIMATED BLOOD LOSS:  Less than 5 mL. INDICATIONS:  Patient presents with a history of an enlarging cystic lesion of the left occipital scalp. Surgery is indicated. OPERATIVE TECHNIQUE:  Patient was taken to the operating room, placed in supine position. Following the infiltration of the lesion and surrounding skin with 1% Xylocaine with 1:100,000 of epinephrine, the patient was prepped and draped in regular fashion. A 15 blade was used to incise the skin in a fusiform fashion, creating a defect measuring roughly 3.5 cm. Any bleeding sites were controlled using bipolar cautery. Deep subcutaneous tissues were closed using 3-0 PDS suture. The skin edges were approximated, everted, and closed using a series of scalp staples. The wound was cleaned, and the patient was then taken to recovery room without any complications. EBL was less than 5 mL. Dictated By Klaudia De Dios MD  d: 09/28/2022 09:51:11  t: 09/28/2022 19:30:44  [facility] [id number]  [facility]/

## 2022-10-06 ENCOUNTER — OFFICE VISIT (OUTPATIENT)
Dept: OTOLARYNGOLOGY | Facility: CLINIC | Age: 42
End: 2022-10-06
Payer: MEDICAID

## 2022-10-06 DIAGNOSIS — L72.9 SCALP CYST: Primary | ICD-10-CM

## 2022-10-06 PROCEDURE — 99024 POSTOP FOLLOW-UP VISIT: CPT | Performed by: OTOLARYNGOLOGY

## 2023-02-09 ENCOUNTER — OFFICE VISIT (OUTPATIENT)
Dept: INTERNAL MEDICINE CLINIC | Facility: CLINIC | Age: 43
End: 2023-02-09
Payer: MEDICAID

## 2023-02-09 VITALS
HEIGHT: 66 IN | WEIGHT: 251.81 LBS | SYSTOLIC BLOOD PRESSURE: 128 MMHG | BODY MASS INDEX: 40.47 KG/M2 | DIASTOLIC BLOOD PRESSURE: 82 MMHG | OXYGEN SATURATION: 100 % | HEART RATE: 85 BPM

## 2023-02-09 DIAGNOSIS — M25.561 CHRONIC PAIN OF BOTH KNEES: Primary | ICD-10-CM

## 2023-02-09 DIAGNOSIS — R73.03 PREDIABETES: ICD-10-CM

## 2023-02-09 DIAGNOSIS — M25.562 CHRONIC PAIN OF BOTH KNEES: Primary | ICD-10-CM

## 2023-02-09 DIAGNOSIS — E78.5 DYSLIPIDEMIA (HIGH LDL; LOW HDL): ICD-10-CM

## 2023-02-09 DIAGNOSIS — G89.29 CHRONIC PAIN OF BOTH KNEES: Primary | ICD-10-CM

## 2023-02-09 DIAGNOSIS — E66.01 MORBID OBESITY WITH BMI OF 40.0-44.9, ADULT (HCC): ICD-10-CM

## 2023-02-09 PROCEDURE — 3079F DIAST BP 80-89 MM HG: CPT | Performed by: FAMILY MEDICINE

## 2023-02-09 PROCEDURE — 3074F SYST BP LT 130 MM HG: CPT | Performed by: FAMILY MEDICINE

## 2023-02-09 PROCEDURE — 3008F BODY MASS INDEX DOCD: CPT | Performed by: FAMILY MEDICINE

## 2023-02-09 PROCEDURE — 99213 OFFICE O/P EST LOW 20 MIN: CPT | Performed by: FAMILY MEDICINE

## 2023-02-10 NOTE — ASSESSMENT & PLAN NOTE
Patient with history of chronic pain to the bilateral knees, recently worsening. Suspect component of osteoarthritis as well as patellofemoral syndrome. Using Voltaren gel as needed-can continue. Discussed that she can also take some Advil as needed for now. Referral to physical therapy provided. Weight loss encouraged. Can hold off on imaging at this time-consider if without significant improvement.   Follow-up in 5 months to reassess

## 2023-02-10 NOTE — PATIENT INSTRUCTIONS
PATIENT INSTRUCTIONS    Thank you for seeing me today, it was a pleasure taking care of you. Please check out at the  and schedule a follow up appointment.   Return in about 5 months (around 7/9/2023) for physical.  Focus on weight loss - healthy diet and exercise  If pain is bad, take some Advil as needed  Voltaren gel as needed  Physical therapy     Best,  Dr. Rao Cervantes

## 2023-03-02 ENCOUNTER — OFFICE VISIT (OUTPATIENT)
Dept: OBGYN CLINIC | Facility: CLINIC | Age: 43
End: 2023-03-02

## 2023-03-02 VITALS
HEIGHT: 66 IN | DIASTOLIC BLOOD PRESSURE: 72 MMHG | SYSTOLIC BLOOD PRESSURE: 106 MMHG | HEART RATE: 79 BPM | BODY MASS INDEX: 39.86 KG/M2 | WEIGHT: 248 LBS

## 2023-03-02 DIAGNOSIS — Z12.31 ENCOUNTER FOR SCREENING MAMMOGRAM FOR MALIGNANT NEOPLASM OF BREAST: Primary | ICD-10-CM

## 2023-03-02 DIAGNOSIS — Z11.3 ROUTINE SCREENING FOR STI (SEXUALLY TRANSMITTED INFECTION): ICD-10-CM

## 2023-03-02 DIAGNOSIS — Z12.11 SCREEN FOR COLON CANCER: ICD-10-CM

## 2023-03-02 PROCEDURE — 3008F BODY MASS INDEX DOCD: CPT | Performed by: NURSE PRACTITIONER

## 2023-03-02 PROCEDURE — 3078F DIAST BP <80 MM HG: CPT | Performed by: NURSE PRACTITIONER

## 2023-03-02 PROCEDURE — 3074F SYST BP LT 130 MM HG: CPT | Performed by: NURSE PRACTITIONER

## 2023-03-02 PROCEDURE — 99396 PREV VISIT EST AGE 40-64: CPT | Performed by: NURSE PRACTITIONER

## 2023-03-03 LAB
C TRACH DNA SPEC QL NAA+PROBE: NEGATIVE
N GONORRHOEA DNA SPEC QL NAA+PROBE: NEGATIVE
T VAGINALIS RRNA SPEC QL NAA+PROBE: NEGATIVE

## 2023-11-10 ENCOUNTER — HOSPITAL ENCOUNTER (OUTPATIENT)
Dept: MAMMOGRAPHY | Facility: HOSPITAL | Age: 43
Discharge: HOME | End: 2023-11-10
Attending: NURSE PRACTITIONER
Payer: MEDICAID

## 2023-11-10 DIAGNOSIS — Z12.31 ENCOUNTER FOR SCREENING MAMMOGRAM FOR MALIGNANT NEOPLASM OF BREAST: ICD-10-CM

## 2023-11-10 PROCEDURE — 77063 BREAST TOMOSYNTHESIS BI: CPT | Performed by: NURSE PRACTITIONER

## 2023-11-10 PROCEDURE — 77067 SCR MAMMO BI INCL CAD: CPT | Performed by: NURSE PRACTITIONER

## 2023-11-13 ENCOUNTER — TELEPHONE (OUTPATIENT)
Dept: OBGYN CLINIC | Facility: CLINIC | Age: 43
End: 2023-11-13

## 2023-11-13 NOTE — TELEPHONE ENCOUNTER
Pt answered and asking if we can call her back in 5 minutes. Advised pt call is for results and not urgent. Pt to call the clinic when she is available. Pt agrees.

## 2023-11-13 NOTE — TELEPHONE ENCOUNTER
Patient notified of normal mammo results and advised of recs for High Risk Breast Clinic. Patient verbalized understanding.

## 2023-11-13 NOTE — TELEPHONE ENCOUNTER
----- Message from GUS Spence sent at 11/10/2023  4:13 PM CST -----  Normal mammogram. Repeat in one year.  Offer referral to high risk clinic, see report    GUS Spence

## 2024-01-17 ENCOUNTER — TELEPHONE (OUTPATIENT)
Dept: INTERNAL MEDICINE CLINIC | Facility: CLINIC | Age: 44
End: 2024-01-17

## 2024-01-17 ENCOUNTER — OFFICE VISIT (OUTPATIENT)
Dept: INTERNAL MEDICINE CLINIC | Facility: CLINIC | Age: 44
End: 2024-01-17
Payer: MEDICAID

## 2024-01-17 VITALS
WEIGHT: 265 LBS | DIASTOLIC BLOOD PRESSURE: 78 MMHG | SYSTOLIC BLOOD PRESSURE: 124 MMHG | HEART RATE: 84 BPM | OXYGEN SATURATION: 99 % | TEMPERATURE: 98 F | HEIGHT: 66 IN | BODY MASS INDEX: 42.59 KG/M2

## 2024-01-17 DIAGNOSIS — M25.562 CHRONIC PAIN OF BOTH KNEES: Primary | ICD-10-CM

## 2024-01-17 DIAGNOSIS — G89.29 CHRONIC PAIN OF BOTH KNEES: Primary | ICD-10-CM

## 2024-01-17 DIAGNOSIS — F32.A DEPRESSIVE DISORDER: ICD-10-CM

## 2024-01-17 DIAGNOSIS — Z00.00 HEALTH MAINTENANCE EXAMINATION: ICD-10-CM

## 2024-01-17 DIAGNOSIS — R73.03 PREDIABETES: ICD-10-CM

## 2024-01-17 DIAGNOSIS — E66.01 MORBID OBESITY WITH BMI OF 40.0-44.9, ADULT (HCC): ICD-10-CM

## 2024-01-17 DIAGNOSIS — M25.561 CHRONIC PAIN OF BOTH KNEES: Primary | ICD-10-CM

## 2024-01-17 DIAGNOSIS — E78.5 DYSLIPIDEMIA (HIGH LDL; LOW HDL): ICD-10-CM

## 2024-01-17 PROBLEM — Z85.42 HISTORY OF ENDOMETRIAL CANCER: Status: ACTIVE | Noted: 2020-11-04

## 2024-01-17 PROBLEM — R63.2 BINGE EATING: Status: RESOLVED | Noted: 2020-11-04 | Resolved: 2024-01-17

## 2024-01-17 PROBLEM — Z80.3 FAMILY HISTORY OF BREAST CANCER: Status: RESOLVED | Noted: 2019-12-11 | Resolved: 2024-01-17

## 2024-01-17 PROBLEM — D50.0 IRON DEFICIENCY ANEMIA DUE TO CHRONIC BLOOD LOSS: Status: RESOLVED | Noted: 2019-07-23 | Resolved: 2024-01-17

## 2024-01-17 LAB
ALBUMIN SERPL-MCNC: 4.7 G/DL (ref 3.2–4.8)
ALBUMIN/GLOB SERPL: 1.5 {RATIO} (ref 1–2)
ALP LIVER SERPL-CCNC: 65 U/L
ALT SERPL-CCNC: 16 U/L
ANION GAP SERPL CALC-SCNC: 8 MMOL/L (ref 0–18)
AST SERPL-CCNC: 17 U/L (ref ?–34)
BILIRUB SERPL-MCNC: 0.5 MG/DL (ref 0.3–1.2)
BUN BLD-MCNC: 8 MG/DL (ref 9–23)
BUN/CREAT SERPL: 9.5 (ref 10–20)
CALCIUM BLD-MCNC: 9.4 MG/DL (ref 8.7–10.4)
CHLORIDE SERPL-SCNC: 108 MMOL/L (ref 98–112)
CHOLEST SERPL-MCNC: 154 MG/DL (ref ?–200)
CO2 SERPL-SCNC: 27 MMOL/L (ref 21–32)
CREAT BLD-MCNC: 0.84 MG/DL
EGFRCR SERPLBLD CKD-EPI 2021: 87 ML/MIN/1.73M2 (ref 60–?)
FASTING PATIENT LIPID ANSWER: NO
FASTING STATUS PATIENT QL REPORTED: NO
GLOBULIN PLAS-MCNC: 3.2 G/DL (ref 2.8–4.4)
GLUCOSE BLD-MCNC: 74 MG/DL (ref 70–99)
HDLC SERPL-MCNC: 38 MG/DL (ref 40–59)
LDLC SERPL CALC-MCNC: 92 MG/DL (ref ?–100)
NONHDLC SERPL-MCNC: 116 MG/DL (ref ?–130)
OSMOLALITY SERPL CALC.SUM OF ELEC: 293 MOSM/KG (ref 275–295)
POTASSIUM SERPL-SCNC: 3.7 MMOL/L (ref 3.5–5.1)
PROT SERPL-MCNC: 7.9 G/DL (ref 5.7–8.2)
SODIUM SERPL-SCNC: 143 MMOL/L (ref 136–145)
TRIGL SERPL-MCNC: 135 MG/DL (ref 30–149)
TSI SER-ACNC: 2.27 MIU/ML (ref 0.55–4.78)
VLDLC SERPL CALC-MCNC: 22 MG/DL (ref 0–30)

## 2024-01-17 PROCEDURE — 90686 IIV4 VACC NO PRSV 0.5 ML IM: CPT | Performed by: FAMILY MEDICINE

## 2024-01-17 PROCEDURE — 99396 PREV VISIT EST AGE 40-64: CPT | Performed by: FAMILY MEDICINE

## 2024-01-17 PROCEDURE — 83036 HEMOGLOBIN GLYCOSYLATED A1C: CPT | Performed by: FAMILY MEDICINE

## 2024-01-17 PROCEDURE — 80053 COMPREHEN METABOLIC PANEL: CPT | Performed by: FAMILY MEDICINE

## 2024-01-17 PROCEDURE — 3008F BODY MASS INDEX DOCD: CPT | Performed by: FAMILY MEDICINE

## 2024-01-17 PROCEDURE — 90471 IMMUNIZATION ADMIN: CPT | Performed by: FAMILY MEDICINE

## 2024-01-17 PROCEDURE — 3074F SYST BP LT 130 MM HG: CPT | Performed by: FAMILY MEDICINE

## 2024-01-17 PROCEDURE — 80061 LIPID PANEL: CPT | Performed by: FAMILY MEDICINE

## 2024-01-17 PROCEDURE — 84443 ASSAY THYROID STIM HORMONE: CPT | Performed by: FAMILY MEDICINE

## 2024-01-17 PROCEDURE — 3078F DIAST BP <80 MM HG: CPT | Performed by: FAMILY MEDICINE

## 2024-01-17 NOTE — PROGRESS NOTES
FAMILY MEDICINE CLINIC NOTE    HPI  Jose Angel Perez is a 43 year old female presenting for physical    #Health Maintenance  -Diet: Fluctuates.   -Exercise: Joined [employer]. Stamina and mobility improved.  -Lung cancer screen: Not indicated  -Colon cancer screen: Colonoscopy   -Statin:  Will check lipid panel  -ASA: Not indicated  -Breast cancer screen:   -Breast cancer medication to reduce risk: Desires   -Periods: LMP - no longer.  -Cervical cancer screen: - Pap no longer needed - history of KRISTI  -DEXA: Not indicated  -BRCA: - Can discus with oncology  -Intimate partner violence: Denies abuse  -HIV screen: - 2021 negative  -Hep C screen: - 2021 negative  -Gonorrhea/chlamydia:  Not Indicated  -Syphillis: Not indicated  -TB: Not indicated  -Tobacco/alcohol: Per below  -Depression: PHQ-2 score of 0 (score >/= 3 do PHQ-9)  -Advanced Directive: indicated    #Immunizations  -Tdap: 2022  -Flu shot: Indicated  -PCV13: Not indicated   -PCV20: Not indicated   -PPSV23: Not indicated  -HPV: Not indicated  -RZV (preferred) or VZL: Not indicated  -RSV: Not indicated  -COVID: Pfizer         #Knee pain---resolved  -has had knee pain for years, comes and goes typically  -lately has gotten worse  -L >R  -first couple of steps worse, gets better with some movement  -this winter really affected her   -not in pain sitting  -left knee feels stiff  -voltaren gel - helping  -eating shark cartilage   -no fever/chills  -no trauma, no injuries   -does note some pain going up and down stairs    2024  -knees are improved  -cold weather makes it worse   -no further issues      #Dyslipidemia  #Prediabetes  -A1c 2022 5.8       #Snoring  S: (snoring) Yes  T: (tired/fatigued during daytime) No  O: (observed stop breathing/gasp @ night) No  P: (pressure=HTN) No  B: (BMI>35) Yes  A: (age>50) No  N: (neck size M >17 inch collar or 43cm, F >16 inch or 41cm) No  G: (Male) No  RUFINO Screenin-2 Low risk of RUFINO       #History  of depression and anxiety  -controlled at this time     #History of endometrial cancer  -GISELE WEBER    #Patient Care Team  Patient Care Team:  Bisi Flower MD as PCP - General (Internal Medicine)  Omar Uribe DO (OBSTETRICS & GYNECOLOGY)  Mary Alice Rebolledo APRN (Nurse Practitioner)    ROS  GENERAL: No fever/chills, no recent weight loss   HEENT: No visual changes, no changes in hearing, no sore throats  NECK: No pain, no swelling  RESP: No cough, no SOB  CV: No chest pain, no palpitations  GI: No abd pain, no N/V/D  MSK: No edema, no pain  SKIN: No new rashes  NEURO: No numbness, no tingling, no HA    HEALTH MAINTENANCE CHECKLIST  Health Maintenance Topics with due status: Overdue       Topic Date Due    Colorectal Cancer Screening Never done    Annual Physical 06/21/2023    COVID-19 Vaccine 09/01/2023    Influenza Vaccine 10/01/2023    Annual Depression Screening 01/01/2024       ALLERGIES  No Known Allergies    MEDICATIONS  No current outpatient medications on file.       ACTIVE PROBLEM  Patient Active Problem List   Diagnosis    Depressive disorder    Morbid obesity with BMI of 40.0-44.9, adult (HCC)    History of endometrial cancer    Snoring    Health maintenance examination    Prediabetes    Dyslipidemia (high LDL; low HDL)       PAST MEDICAL HISTORY  Past Medical History:   Diagnosis Date    Anxiety state     Depression     Dyslipidemia (high LDL; low HDL) 06/22/2022    Endometrial cancer (HCC)     stage 1. KRISTI/BSO 3/20 with Dr. andino    Iron deficiency anemia due to chronic blood loss 07/23/2019    Suicide attempt ([facility]) 05/28/2015    Uterine fibroids affecting pregnancy     Visual impairment     Glasses       PAST SOCIAL HISTORY  Social History     Socioeconomic History    Marital status:      Spouse name: Not on file    Number of children: Not on file    Years of education: Not on file    Highest education level: Not on file   Occupational History    Occupation: Quality  and Compliance     Employer: [employer]   Tobacco Use    Smoking status: Never    Smokeless tobacco: Never   Vaping Use    Vaping Use: Never used   Substance and Sexual Activity    Alcohol use: Yes     Comment: occasional - once every month or two    Drug use: Not Currently    Sexual activity: Not Currently     Partners: Male   Other Topics Concern    Caffeine Concern Not Asked    Exercise Not Asked    Seat Belt Not Asked    Special Diet Not Asked    Stress Concern Not Asked    Weight Concern Not Asked   Social History Narrative    Relationships:     Children: 3 kids - Winter (18F), Jewels (11F), Mar (10F)    Pets: None    School: N/A    Work: [employer].     Origin: [place].     Interests:  Enjoys places with kids.     Spiritual: Rastafari - important     Social Determinants of Health     Financial Resource Strain: Not on file   Food Insecurity: Not on file   Transportation Needs: Not on file   Physical Activity: Not on file   Stress: Not on file   Social Connections: Not on file   Housing Stability: Not on file       PAST SURGICAL HISTORY  Past Surgical History:   Procedure Laterality Date          X 3    CHOLECYSTECTOMY          EXC SKIN BENIG 2.1-3CM REMAINDR BODY Left     Scalp cyst    REPR CMPL WND HEAD,FAC,HAND 2.6-7.5 Left     Scalp cyst    TOTAL ABDOMINAL HYSTERECTOMY      BSO       PAST FAMILY HISTORY  Family History   Problem Relation Age of Onset    Breast Cancer Mother 34    Depression Father     Anxiety Father     No Known Problems Brother     No Known Problems Maternal Grandmother     Cancer Maternal Grandfather         unknown type    No Known Problems Paternal Grandmother     No Known Problems Paternal Grandfather         drowned    Breast Cancer Maternal Aunt 39    Breast Cancer Maternal Aunt     Breast Cancer Paternal Aunt 45    No Known Problems Half-Brother     Colon Cancer Neg        PHYSICAL EXAM  Vitals:    24 1307   BP: 124/78   Pulse: 84   Temp: 97.7  °F (36.5 °C)   SpO2: 99%   Weight: 265 lb (120.2 kg)   Height: 5' 6\" (1.676 m)      Body mass index is 42.77 kg/m².    GENERAL: NAD  HEENT: Moist mucous membranes, no tonsillar swelling or erythema, PERRLA bilat, TM translucent and non-bulging  NECK: Supple, non-tender  RESP: CTAB, no wheezing, no rales, no ronchi  CV: RRR, no murmurs  GI: Soft, non-distended, non-tender, no guarding, no rebound, no masses  MSK: No edema  SKIN: Warm and dry, no rashes  NEURO: Answering questions appropriately    LABS  Lab Results   Component Value Date    WBC 8.7 06/21/2022    HGB 13.7 06/21/2022    HCT 44.7 06/21/2022    .0 06/21/2022    NEPERCENT 63.4 06/21/2022    LYPERCENT 26.7 06/21/2022    MOPERCENT 5.1 06/21/2022    EOPERCENT 3.8 06/21/2022    BAPERCENT 0.8 06/21/2022    NE 5.52 06/21/2022    LYMABS 2.32 06/21/2022    MOABSO 0.44 06/21/2022    EOABSO 0.33 06/21/2022    BAABSO 0.07 06/21/2022       Lab Results   Component Value Date     06/21/2022    K 3.7 06/21/2022     06/21/2022    CO2 26.0 06/21/2022    ANIONGAP 10 06/21/2022    BUN 7 06/21/2022    CREATSERUM 0.83 06/21/2022    BUNCREA 8.4 (L) 06/21/2022    GLU 99 06/21/2022    CA 9.4 06/21/2022    OSMOCALC 290 06/21/2022    GFRNAA 87 06/21/2022    GFRAA 100 06/21/2022    ALT 20 06/21/2022    AST 13 (L) 06/21/2022    ALKPHO 65 06/21/2022    BILT 0.6 06/21/2022    TP 8.4 (H) 06/21/2022    ALB 4.3 06/21/2022    GLOBULIN 4.1 06/21/2022    ELECTAG 1.0 06/21/2022    FASTING No 06/21/2022    FASTING No 06/21/2022         Lab Results   Component Value Date    CHOLEST 167 06/21/2022    TRIG 127 06/21/2022    HDL 35 (L) 06/21/2022     (H) 06/21/2022    VLDL 22 06/21/2022    NONHDLC 132 (H) 06/21/2022        DIAGNOSTICS    ASSESSMENT/PLAN  Problem List Items Addressed This Visit          HCC Problems    Morbid obesity with BMI of 40.0-44.9, adult (HCC)     Healthy diet and exercise advised.  Referral to weight loss specialist provided.          Relevant  Orders    [facility] GI Telephone Colon Screen    [facility] Weight Management - Dr. Adeel Knott [facility] EMMG 9       Cardiac and Vasculature    Dyslipidemia (high LDL; low HDL)     Healthy diet and exercise advised.  Check CMP and lipid panel.             Endocrine and Metabolic    Prediabetes     Healthy diet and exercise advised.  Check hemoglobin A1c.            Mental Health    Depressive disorder     Patient reports history of anxiety and depression, currently controlled.  Advised to follow-up if with worsening symptoms.            Musculoskeletal and Injuries    RESOLVED: Chronic pain of both knees - Primary     No further issues.             Health Encounters    Health maintenance examination     Exercise and diet advised.  CMP, lipid panel, Hba1c, TSH, HIV screen  Flu shot today.  Advanced directive information provided.  Advised COVID vaccine booster.  Colonoscopy referral provided.  Mammogram ordered for her to get in November.           Relevant Orders    Comp Metabolic Panel (14)    Lipid Panel    TSH W Reflex To Free T4    Hemoglobin A1C    FLULAVAL INFLUENZA VACCINE QUAD PRESERVATIVE FREE 0.5 ML    GERARDO RAUDEL 2D+3D SCREENING BILAT (CPT=77067/44271)       Return in about 1 year (around 1/17/2025) for physical .    Zain Guan MD  Family Medicine

## 2024-01-17 NOTE — TELEPHONE ENCOUNTER
Patient called as a follow up from today's visit (01/17/2024).     Patient has questions regarding the Bariatric referral that was discussed today.    Please call patient at:    [phone]     KG

## 2024-01-17 NOTE — ASSESSMENT & PLAN NOTE
Exercise and diet advised.  CMP, lipid panel, Hba1c, TSH, HIV screen  Flu shot today.  Advanced directive information provided.  Advised COVID vaccine booster.  Colonoscopy referral provided.  Mammogram ordered for her to get in November.

## 2024-01-17 NOTE — PATIENT INSTRUCTIONS
PATIENT INSTRUCTIONS    Thank you for seeing me today, it was a pleasure taking care of you.  Please check out at the  and schedule a follow up appointment.  Return in about 1 year (around 1/17/2025) for physical .  Please remember that the raffaele period for all appointments is 5 minutes. This is to help maximize the amount of time that we can spend together at our visits.    Please get your labs drawn - you may need to schedule a lab appointment if this was not completed at your recent doctor's visit.  The following imaging studies were ordered: Mammogram - November 2024  Please also follow up with the following specialists: OBGYN, weight specialist, GI - colonoscopy  Please call [phone] to talk to an individual who will help you schedule your colonoscopy.  Please fill out the advance directive information (power of  documents) and bring a copy to our clinic.  GENO Ayers,   Dr. Guan

## 2024-01-18 ENCOUNTER — TELEPHONE (OUTPATIENT)
Dept: INTERNAL MEDICINE CLINIC | Facility: CLINIC | Age: 44
End: 2024-01-18

## 2024-01-18 LAB
EST. AVERAGE GLUCOSE BLD GHB EST-MCNC: 120 MG/DL (ref 68–126)
HBA1C MFR BLD: 5.8 % (ref ?–5.7)

## 2024-01-18 NOTE — TELEPHONE ENCOUNTER
Patient called stating that she missed 's call and would like him to try and call her back whenever he gets a moment.

## 2024-03-15 ENCOUNTER — OFFICE VISIT (OUTPATIENT)
Dept: SURGERY | Facility: CLINIC | Age: 44
End: 2024-03-15
Payer: MEDICAID

## 2024-03-15 VITALS
HEART RATE: 72 BPM | OXYGEN SATURATION: 95 % | HEIGHT: 66 IN | BODY MASS INDEX: 43.39 KG/M2 | DIASTOLIC BLOOD PRESSURE: 80 MMHG | WEIGHT: 270 LBS | SYSTOLIC BLOOD PRESSURE: 118 MMHG

## 2024-03-15 DIAGNOSIS — Z51.81 ENCOUNTER FOR THERAPEUTIC DRUG MONITORING: ICD-10-CM

## 2024-03-15 DIAGNOSIS — E78.5 DYSLIPIDEMIA: Primary | ICD-10-CM

## 2024-03-15 DIAGNOSIS — E66.01 MORBID OBESITY WITH BMI OF 40.0-44.9, ADULT (HCC): ICD-10-CM

## 2024-03-15 DIAGNOSIS — R73.03 PREDIABETES: ICD-10-CM

## 2024-03-15 PROCEDURE — 99214 OFFICE O/P EST MOD 30 MIN: CPT | Performed by: NURSE PRACTITIONER

## 2024-03-15 RX ORDER — TIRZEPATIDE 2.5 MG/.5ML
2.5 INJECTION, SOLUTION SUBCUTANEOUS WEEKLY
Qty: 2 ML | Refills: 1 | Status: SHIPPED | OUTPATIENT
Start: 2024-03-15

## 2024-03-15 NOTE — PROGRESS NOTES
[facility], [facility], [place]  [address]  [facility] [address]  Dept: [phone]       Patient:  Jose Angel Perez  :      1980  MRN:      [id number]    Chief Complaint:    Chief Complaint   Patient presents with    Follow - Up    Weight Management     Last seen     Obesity       SUBJECTIVE     History of Present Illness:  Jose Angel is being seen today for a follow-up for weight management.    Last OV 2021.    Past Medical History:   Past Medical History:   Diagnosis Date    Anxiety state     Depression     Dyslipidemia (high LDL; low HDL) 2022    Endometrial cancer (HCC)     stage 1. KRISTI/BSO 3/20 with Dr. andino    Iron deficiency anemia due to chronic blood loss 2019    Suicide attempt (HCC) 2015    Uterine fibroids affecting pregnancy (HCC)     Visual impairment     Glasses        Comorbidities:  Back pain-Improvement?  yes, Joint pain-Improvement?  yes, AXEL-Improvement?  yes and Snoring-Improvement?  yes    OBJECTIVE     Vitals: /80   Pulse 72   Ht 5' 6\" (1.676 m)   Wt 270 lb (122.5 kg)   LMP 2020   SpO2 95%   BMI 43.58 kg/m²     Initial weight loss:    Total weight loss: +08   Start weight: 262    Wt Readings from Last 3 Encounters:   03/15/24 270 lb (122.5 kg)   24 265 lb (120.2 kg)   23 248 lb (112.5 kg)       Patient Medications:    Current Outpatient Medications   Medication Sig Dispense Refill    Tirzepatide-Weight Management (ZEPBOUND) 2.5 MG/0.5ML Subcutaneous Solution Auto-injector Inject 2.5 mg into the skin once a week. 2 mL 1     Allergies:  Patient has no known allergies.     Social History: Reviewed     Surgical History:    Past Surgical History:   Procedure Laterality Date          X 3    CHOLECYSTECTOMY          EXC SKIN BENIG 2.1-3CM REMAINDR BODY Left     Scalp cyst    REPR CMPL WND HEAD,FAC,HAND 2.6-7.5 Left     Scalp cyst    TOTAL ABDOMINAL  HYSTERECTOMY      BSO     Family History:    Family History   Problem Relation Age of Onset    Breast Cancer Mother 34    Depression Father     Anxiety Father     No Known Problems Brother     No Known Problems Maternal Grandmother     Cancer Maternal Grandfather         unknown type    No Known Problems Paternal Grandmother     No Known Problems Paternal Grandfather         drowned    Breast Cancer Maternal Aunt 39    Breast Cancer Maternal Aunt     Breast Cancer Paternal Aunt 45    No Known Problems Half-Brother     Colon Cancer Neg        Food Journal  Reviewed and Discussed:       Patient has a Food Journal?: no likes written log    Patient is reading nutrition labels?  yes  Average Caloric Intake:     Average CHO Intake:   Is patient exercising? no not consistently  Type of exercise? Likes to walk  Has [employer] membership     Eating Habits  Patient states the following:  Eats 2 meal(s) per day  # of snacks per day:  Type of snacks:    Amount of soda consumption per day:    Amount of water (in ounces) per day: needs to improve   Toughest challenge:      Nutritional Goals  Eat 3-4 cups of fresh fruits or vegetables daily    Behavior Modifications Reviewed and Discussed  Eat breakfast, Eat 3 meals per day, Plan meals in advance, Read nutrition labels, Drink 64 oz of water per day, Maintain a daily food journal, Utlize portion control strategies to reduce calorie intake, Identify triggers for eating and manage cues, and Eat slowly and take 20 to 30 minutes to complete each meal    Exercise Goals Reviewed and Discussed    Aim for 150 minutes moderate level exercise weekly with 2-3 days strength training as tolerated     ROS:    Constitutional: negative  Respiratory: negative  Cardiovascular: negative  Gastrointestinal: negative  Musculoskeletal:negative  Neurological: negative  Behavioral/Psych: negative  Endocrine: negative  All other systems were reviewed and are negative    Physical Exam:   General  appearance: alert, appears stated age, cooperative and obese  Head: Normocephalic, without obvious abnormality, atraumatic  Neck: no adenopathy, no carotid bruit, no JVD, supple, symmetrical, trachea midline and thyroid not enlarged, symmetric, no tenderness/mass/nodules  Lungs: clear to auscultation bilaterally  Heart: S1, S2 normal, no murmur, click, rub or gallop, regular rate and rhythm  Abdomen: soft, non-tender; bowel sounds normal; no masses,  no organomegaly and abdomen obese   Extremities: intact, no edema   Pulses: 2+ and symmetric  Skin: intact   Neurologic: Grossly normal    ASSESSMENT       Encounter Diagnosis(ses):   Encounter Diagnoses   Name Primary?    Prediabetes     Dyslipidemia Yes    Morbid obesity with BMI of 40.0-44.9, adult ([facility])     Encounter for therapeutic drug monitoring        PLAN     Patient is not interested in bariatric surgery. Patient desires to pursue traditional weight loss at this time.      DYSLIPIDEMIA: Recommend dietary changes and lifestyle modifications as discussed below. Monitor.     Lab Results   Component Value Date/Time    CHOLEST 154 01/17/2024 01:42 PM    LDL 92 01/17/2024 01:42 PM    HDL 38 (L) 01/17/2024 01:42 PM    TRIG 135 01/17/2024 01:42 PM    VLDL 22 01/17/2024 01:42 PM       Iron def: supplement daily.     OBESITY:  PREDIABETES:   Goals for next month:  1. Keep a food log.  2. Drink 64 ounces of non-caloric beverages per day. No fruit juices or regular soda.  3. Increase activity-upper body exercises, walk 10 minutes per day.  4. Increase fruit and vegetable servings to 5-6 per day.      Has taken Vyvanse in the past.   2/24/21- abnormal EKG.  Dislikes taking pills.     Denies personal or family hx medullary thyroid CA, endocrine neoplasia syndrome, pancreatitis hx, suicidal ideation. No renal impairment, severe GI disease, diabetes, pancreatitis risks noted.    Start Zepbound 2.5 mg weekly. Increase dose monthly as tolerated.    SQ administration teaching  provided to patient.   Discussed risks, benefits, and side effects of medication. Avoid pregnancy during use. Contraindications for medication discussed at length. Patient states understanding.     Labs done 1/17/24- a1c- prediabetes.  TSH, cholesterol, TSH, CMP in range.      Declines bariatric surgery.     Meet with RD.    Diagnoses and all orders for this visit:    Dyslipidemia  -     DIETITIAN EDUCATION INITIAL, DIET (INTERNAL)    Prediabetes  -     DIETITIAN EDUCATION INITIAL, DIET (INTERNAL)    Morbid obesity with BMI of 40.0-44.9, adult ([facility])  -     Tirzepatide-Weight Management (ZEPBOUND) 2.5 MG/0.5ML Subcutaneous Solution Auto-injector; Inject 2.5 mg into the skin once a week.  -     DIETITIAN EDUCATION INITIAL, DIET (INTERNAL)    Encounter for therapeutic drug monitoring  -     DIETITIAN EDUCATION INITIAL, DIET (INTERNAL)      GUS Penaloza

## 2024-03-15 NOTE — PATIENT INSTRUCTIONS
Zepbound schedule:    2.5 mg once a week for 4 consecutive weeks, then may increase  5 mg once a week for 4 consecutive weeks, then may increase  7.5 mg once a week for 4 consecutive weeks, then may increase  10 mg once a week for 4 consecutive weeks, then may increase  12.5 mg once a week for 4 consecutive weeks, then may increase  15 mg once a week- final dose     Each month, please message me to let me know how you are doing. I can either refill the medication to a higher dose as stated above, or you will have refills at the current/same dose until you work through the side effects which are typically nausea, vomiting, GI upset, heart burn, constipation, loose stool, and/or fatigue.

## 2024-03-27 ENCOUNTER — TELEPHONE (OUTPATIENT)
Dept: SURGERY | Facility: CLINIC | Age: 44
End: 2024-03-27

## 2024-03-28 DIAGNOSIS — E66.01 MORBID OBESITY WITH BMI OF 40.0-44.9, ADULT (HCC): Primary | ICD-10-CM

## 2024-03-28 RX ORDER — TIRZEPATIDE 5 MG/.5ML
5 INJECTION, SOLUTION SUBCUTANEOUS WEEKLY
Qty: 2 ML | Refills: 1 | Status: SHIPPED | OUTPATIENT
Start: 2024-03-28

## 2024-04-03 DIAGNOSIS — E66.01 MORBID OBESITY WITH BMI OF 40.0-44.9, ADULT (HCC): ICD-10-CM

## 2024-04-05 RX ORDER — TIRZEPATIDE 5 MG/.5ML
5 INJECTION, SOLUTION SUBCUTANEOUS WEEKLY
Qty: 2 ML | Refills: 1 | OUTPATIENT
Start: 2024-04-05

## 2024-04-08 DIAGNOSIS — E66.01 MORBID OBESITY WITH BMI OF 40.0-44.9, ADULT (HCC): ICD-10-CM

## 2024-04-10 ENCOUNTER — TELEPHONE (OUTPATIENT)
Dept: SURGERY | Facility: CLINIC | Age: 44
End: 2024-04-10

## 2024-04-10 NOTE — TELEPHONE ENCOUNTER
Prior authorization for Zepbound 5mg was requested. Per [facility], No prior authorization is needed for 5mg of Zepbound because there is PAs in place until 11/10/24.Patient's [facility] stated that they do not have 5 mg dose in stock and wont have it until 5/1/24. They only have 2.5 mg in stock. Patient will contact other local pharmacies to check if they have Zepbound 5 mg in stock and will let us to know where to transfer script.

## 2024-04-16 NOTE — Clinical Note
Mandi,  I saw Sea Sherita in clinic today for weight loss/management. I have recommended intensive lifestyle/behavioral modifications for weight loss.  In addition, I have recommended low dose Vyvanse for binge eating, counseling for these behaviors, and to m The patient was seen today and did not get a RTW slip. She states Balwinder wanted her to stay off work until 5/1 and when she returns she will need restrictions. She would like a copy of this put in [facility].     [phone]

## 2024-04-22 RX ORDER — TIRZEPATIDE 2.5 MG/.5ML
2.5 INJECTION, SOLUTION SUBCUTANEOUS WEEKLY
Qty: 2 ML | Refills: 1 | Status: SHIPPED | OUTPATIENT
Start: 2024-04-22

## 2024-04-23 DIAGNOSIS — E66.01 MORBID OBESITY WITH BMI OF 40.0-44.9, ADULT (HCC): ICD-10-CM

## 2024-04-23 RX ORDER — TIRZEPATIDE 2.5 MG/.5ML
2.5 INJECTION, SOLUTION SUBCUTANEOUS WEEKLY
Qty: 2 ML | Refills: 1 | OUTPATIENT
Start: 2024-04-23

## 2024-04-26 DIAGNOSIS — E66.01 MORBID OBESITY WITH BMI OF 40.0-44.9, ADULT (HCC): ICD-10-CM

## 2024-04-29 RX ORDER — TIRZEPATIDE 2.5 MG/.5ML
INJECTION, SOLUTION SUBCUTANEOUS
Qty: 2 ML | Refills: 1 | OUTPATIENT
Start: 2024-04-29

## 2024-07-22 ENCOUNTER — TELEMEDICINE (OUTPATIENT)
Dept: SURGERY | Facility: CLINIC | Age: 44
End: 2024-07-22
Payer: MEDICAID

## 2024-07-22 VITALS — BODY MASS INDEX: 42 KG/M2 | WEIGHT: 263 LBS

## 2024-07-22 DIAGNOSIS — E66.01 MORBID OBESITY WITH BMI OF 40.0-44.9, ADULT (HCC): ICD-10-CM

## 2024-07-22 DIAGNOSIS — R73.03 PREDIABETES: ICD-10-CM

## 2024-07-22 DIAGNOSIS — Z51.81 ENCOUNTER FOR THERAPEUTIC DRUG MONITORING: ICD-10-CM

## 2024-07-22 DIAGNOSIS — E78.5 DYSLIPIDEMIA: Primary | ICD-10-CM

## 2024-07-22 RX ORDER — ONDANSETRON 4 MG/1
4 TABLET, ORALLY DISINTEGRATING ORAL EVERY 8 HOURS PRN
Qty: 30 TABLET | Refills: 0 | Status: SHIPPED | OUTPATIENT
Start: 2024-07-22

## 2024-07-22 NOTE — PROGRESS NOTES
Virtual Video & Audio Check-In    Jose Angel Perez verbally consents to a Virtual Video & Audio Check-In visit on 24.  Patient has been referred to the [facility] website at [email or web address] to review the yearly Consent to Treat document.    Patient understands and accepts financial responsibility for any deductible, co-insurance and/or co-pays associated with this service.    Duration of the service: 9 minutes.    Summary of topics discussed: Obesity/weight management, Lifestyle and behavior modifications, Medication management.      [facility]  [address]  [facility] [address]  Dept: [phone]       Patient:  Jose Angel Perez  :      1980  MRN:      [id number]    Chief Complaint:    Chief Complaint   Patient presents with    Follow - Up    Obesity    Weight Management       SUBJECTIVE     History of Present Illness:  Jose Angel is being seen today for a follow-up for weight management.    Has [employer] insurance now.   On Zepbound. Last injection 2024.     Past Medical History:   Past Medical History:    Anxiety state    Depression    Dyslipidemia (high LDL; low HDL)    Endometrial cancer (HCC)    stage 1. KRISTI/BSO 3/20 with Dr. andino    Iron deficiency anemia due to chronic blood loss    Suicide attempt (HCC)    Uterine fibroids affecting pregnancy (HCC)    Visual impairment    Glasses        Comorbidities:  Back pain-Improvement?  yes, Joint pain-Improvement?  yes, AXEL-Improvement?  yes and Snoring-Improvement?  yes    OBJECTIVE     Vitals: Wt 263 lb (119.3 kg)   LMP 2020   BMI 42.45 kg/m²     Initial weight loss: -07   Total weight loss: +01   Start weight: 262    Wt Readings from Last 3 Encounters:   24 263 lb (119.3 kg)   03/15/24 270 lb (122.5 kg)   24 265 lb (120.2 kg)       Patient Medications:    Current Outpatient Medications   Medication Sig Dispense Refill    ondansetron 4 MG  Oral Tablet Dispersible Take 1 tablet (4 mg total) by mouth every 8 (eight) hours as needed. 30 tablet 0    Tirzepatide-Weight Management (ZEPBOUND) 5 MG/0.5ML Subcutaneous Solution Auto-injector Inject 5 mg into the skin once a week. 2 mL 1    Tirzepatide-Weight Management (ZEPBOUND) 2.5 MG/0.5ML Subcutaneous Solution Auto-injector Inject 2.5 mg into the skin once a week. 2 mL 1     Allergies:  Patient has no known allergies.     Social History: Reviewed     Surgical History:    Past Surgical History:   Procedure Laterality Date          X 3    Cholecystectomy          Exc skin benig 2.1-3cm remaindr body Left     Scalp cyst    Repr cmpl wnd head,fac,hand 2.6-7.5 Left     Scalp cyst    Total abdominal hysterectomy      BSO     Family History:    Family History   Problem Relation Age of Onset    Breast Cancer Mother 34    Depression Father     Anxiety Father     No Known Problems Brother     No Known Problems Maternal Grandmother     Cancer Maternal Grandfather         unknown type    No Known Problems Paternal Grandmother     No Known Problems Paternal Grandfather         drowned    Breast Cancer Maternal Aunt 39    Breast Cancer Maternal Aunt     Breast Cancer Paternal Aunt 45    No Known Problems Half-Brother     Colon Cancer Neg        Food Journal  Reviewed and Discussed:       Patient has a Food Journal?: no likes written log    Patient is reading nutrition labels?  yes  Average Caloric Intake:     Average CHO Intake:   Is patient exercising? yes   Type of exercise? Walks every morning 40-45 minute sessions   Has [employer] membership     Eating Habits  Patient states the following:  Eats 2 meal(s) per day  # of snacks per day:  Type of snacks:    Amount of soda consumption per day:    Amount of water (in ounces) per day: needs to improve   Toughest challenge:      Aim for 90-95 grams of protein/day  Some sources: fish/salmon    Nutritional Goals  Eat 3-4 cups of fresh fruits or vegetables  daily    Behavior Modifications Reviewed and Discussed  Eat breakfast, Eat 3 meals per day, Plan meals in advance, Read nutrition labels, Drink 64 oz of water per day, Maintain a daily food journal, Utlize portion control strategies to reduce calorie intake, Identify triggers for eating and manage cues, and Eat slowly and take 20 to 30 minutes to complete each meal    Exercise Goals Reviewed and Discussed    Aim for 150 minutes moderate level exercise weekly with 2-3 days strength training as tolerated     ROS:    Constitutional: negative  Respiratory: negative  Cardiovascular: negative  Gastrointestinal: negative  Musculoskeletal:negative  Neurological: negative  Behavioral/Psych: negative  Endocrine: negative  All other systems were reviewed and are negative    Physical Exam:   General appearance: alert, appears stated age, cooperative and obese  Head: Normocephalic, without obvious abnormality, atraumatic  Neck: no adenopathy, no carotid bruit, no JVD, supple, symmetrical, trachea midline and thyroid not enlarged, symmetric, no tenderness/mass/nodules  Lungs: clear to auscultation bilaterally  Heart: S1, S2 normal, no murmur, click, rub or gallop, regular rate and rhythm  Abdomen: soft, non-tender; bowel sounds normal; no masses,  no organomegaly and abdomen obese   Extremities: intact, no edema   Pulses: 2+ and symmetric  Skin: intact   Neurologic: Grossly normal    ASSESSMENT       Encounter Diagnosis(ses):   Encounter Diagnoses   Name Primary?    Dyslipidemia Yes    Morbid obesity with BMI of 40.0-44.9, adult (HCC)     Prediabetes     Encounter for therapeutic drug monitoring        PLAN       DYSLIPIDEMIA: Recommend dietary changes and lifestyle modifications as discussed below. Monitor.     Lab Results   Component Value Date/Time    CHOLEST 154 01/17/2024 01:42 PM    LDL 92 01/17/2024 01:42 PM    HDL 38 (L) 01/17/2024 01:42 PM    TRIG 135 01/17/2024 01:42 PM    VLDL 22 01/17/2024 01:42 PM       Iron def:  supplement daily.     OBESITY:  PREDIABETES:   Goals for next month:  1. Keep a food log.  2. Drink 64 ounces of non-caloric beverages per day. No fruit juices or regular soda.  3. Increase activity-upper body exercises, walk 10 minutes per day.  4. Increase fruit and vegetable servings to 5-6 per day.      Has taken Vyvanse in the past.   2/24/21- abnormal EKG.  Dislikes taking pills.     Denies personal or family hx medullary thyroid CA, endocrine neoplasia syndrome, pancreatitis hx, suicidal ideation. No renal impairment, severe GI disease, diabetes, pancreatitis risks noted.    Continue Zepbound- increase to 5 mg weekly.   Increase dose monthly as tolerated.  Zofran as needed for delays in injections due to medication shortages.     SQ administration teaching provided to patient.   Discussed risks, benefits, and side effects of medication. Avoid pregnancy during use. Contraindications for medication discussed at length. Patient states understanding.     Labs done 1/17/24- a1c- prediabetes.  TSH, cholesterol, TSH, CMP in range.      Declines bariatric surgery.     Meet with RD.    Diagnoses and all orders for this visit:    Dyslipidemia  -     DIETITIAN EDUCATION INITIAL, DIET (INTERNAL)    Morbid obesity with BMI of 40.0-44.9, adult (HCC)  -     DIETITIAN EDUCATION INITIAL, DIET (INTERNAL)    Prediabetes  -     DIETITIAN EDUCATION INITIAL, DIET (INTERNAL)    Encounter for therapeutic drug monitoring  -     DIETITIAN EDUCATION INITIAL, DIET (INTERNAL)    Other orders  -     ondansetron 4 MG Oral Tablet Dispersible; Take 1 tablet (4 mg total) by mouth every 8 (eight) hours as needed.      GUS Penaloza

## 2024-08-13 DIAGNOSIS — E66.01 MORBID OBESITY WITH BMI OF 40.0-44.9, ADULT (HCC): ICD-10-CM

## 2024-08-13 RX ORDER — TIRZEPATIDE 5 MG/.5ML
INJECTION, SOLUTION SUBCUTANEOUS
Qty: 2 ML | Refills: 1 | Status: SHIPPED | OUTPATIENT
Start: 2024-08-13

## 2024-09-05 ENCOUNTER — TELEPHONE (OUTPATIENT)
Dept: SURGERY | Facility: CLINIC | Age: 44
End: 2024-09-05

## 2024-09-05 DIAGNOSIS — E66.01 MORBID OBESITY WITH BMI OF 40.0-44.9, ADULT (HCC): ICD-10-CM

## 2024-09-05 RX ORDER — TIRZEPATIDE 5 MG/.5ML
5 INJECTION, SOLUTION SUBCUTANEOUS
Qty: 2 ML | Refills: 1 | Status: SHIPPED | OUTPATIENT
Start: 2024-09-05

## 2024-11-20 ENCOUNTER — APPOINTMENT (OUTPATIENT)
Dept: GENERAL RADIOLOGY | Facility: HOSPITAL | Age: 44
End: 2024-11-20
Attending: NURSE PRACTITIONER
Payer: COMMERCIAL

## 2024-11-20 ENCOUNTER — HOSPITAL ENCOUNTER (EMERGENCY)
Facility: HOSPITAL | Age: 44
Discharge: HOME | End: 2024-11-20
Payer: COMMERCIAL

## 2024-11-20 VITALS
DIASTOLIC BLOOD PRESSURE: 64 MMHG | TEMPERATURE: 97 F | SYSTOLIC BLOOD PRESSURE: 125 MMHG | BODY MASS INDEX: 40.02 KG/M2 | RESPIRATION RATE: 16 BRPM | WEIGHT: 255 LBS | HEIGHT: 67 IN | HEART RATE: 64 BPM | OXYGEN SATURATION: 98 %

## 2024-11-20 DIAGNOSIS — S70.02XA CONTUSION OF LEFT HIP, INITIAL ENCOUNTER: Primary | ICD-10-CM

## 2024-11-20 PROCEDURE — 73502 X-RAY EXAM HIP UNI 2-3 VIEWS: CPT | Performed by: NURSE PRACTITIONER

## 2024-11-20 PROCEDURE — 99283 EMERGENCY DEPT VISIT LOW MDM: CPT
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
## 2024-11-26 ENCOUNTER — OFFICE VISIT (OUTPATIENT)
Dept: INTERNAL MEDICINE CLINIC | Facility: CLINIC | Age: 44
End: 2024-11-26
Payer: COMMERCIAL

## 2024-11-26 VITALS
WEIGHT: 253 LBS | TEMPERATURE: 98 F | HEIGHT: 67 IN | HEART RATE: 99 BPM | BODY MASS INDEX: 39.71 KG/M2 | SYSTOLIC BLOOD PRESSURE: 126 MMHG | OXYGEN SATURATION: 100 % | DIASTOLIC BLOOD PRESSURE: 80 MMHG
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
## 2024-11-26 DIAGNOSIS — M25.552 HIP PAIN, ACUTE, LEFT: ICD-10-CM

## 2024-11-26 PROCEDURE — 3008F BODY MASS INDEX DOCD: CPT

## 2024-11-26 PROCEDURE — 3079F DIAST BP 80-89 MM HG: CPT

## 2024-11-26 PROCEDURE — 99213 OFFICE O/P EST LOW 20 MIN: CPT

## 2024-11-26 PROCEDURE — 3074F SYST BP LT 130 MM HG: CPT

## 2024-11-26 RX ORDER — CYCLOBENZAPRINE HCL 10 MG
10 TABLET ORAL 3 TIMES DAILY PRN
Qty: 21 TABLET | Refills: 0 | Status: SHIPPED | OUTPATIENT
Start: 2024-11-26 | End: 2024-12-16

## 2024-11-26 RX ORDER — MELOXICAM 15 MG/1
15 TABLET ORAL DAILY
Qty: 30 EACH | Refills: 0 | Status: SHIPPED | OUTPATIENT
Start: 2024-11-26
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
## 2024-11-27 PROBLEM — C54.1 ENDOMETRIAL CANCER (HCC): Status: ACTIVE | Noted: 2024-11-27

## 2024-11-27 PROBLEM — D64.9 ANEMIA: Status: ACTIVE | Noted: 2024-11-27

## 2024-11-27 NOTE — PATIENT INSTRUCTIONS
Hip Adductor Stretch (Flexibility)   Sit on the floor. Put the soles of your feet together so your knees are pointed outward.  Pull your heels in toward your groin, as close as is comfortable.  Put your hands on your knees, and gently push them closer to the floor.  Hold for 30 to 60 seconds.  Relax and repeat 2 to 3 times.  Repeat this exercise 3 times a day.    Mario last reviewed this educational content on 7/1/2022       Hip Flexor Stretch (Flexibility)    Kneel on the floor on a mat or carpet. Put your right foot on the floor in front of you, with the knee bent. Hold on to a chair for balance if needed.  Press your hips forward, keeping your back and shoulders upright. Feel the stretch in the front of your left hip.  Hold for  30 to 60 seconds. Relax.  Repeat  2 to 3 times. Switch sides.   Repeat 3 times per day, or as instructed.    Mario last reviewed this educational content on 12/1/2022

## 2024-12-23 ENCOUNTER — TELEPHONE (OUTPATIENT)
Dept: PHYSICAL THERAPY | Facility: HOSPITAL | Age: 44
End: 2024-12-23

## 2024-12-30 ENCOUNTER — TELEPHONE (OUTPATIENT)
Dept: INTERNAL MEDICINE CLINIC | Facility: CLINIC | Age: 44
End: 2024-12-30

## 2024-12-31 ENCOUNTER — APPOINTMENT (OUTPATIENT)
Dept: PHYSICAL THERAPY | Facility: HOSPITAL | Age: 44
End: 2024-12-31
Payer: COMMERCIAL

## 2025-01-08 ENCOUNTER — HOSPITAL ENCOUNTER (OUTPATIENT)
Dept: MAMMOGRAPHY | Facility: HOSPITAL | Age: 45
Discharge: HOME | End: 2025-01-08
Attending: FAMILY MEDICINE
Payer: COMMERCIAL

## 2025-01-08 DIAGNOSIS — Z00.00 HEALTH MAINTENANCE EXAMINATION: ICD-10-CM

## 2025-01-08 PROCEDURE — 77063 BREAST TOMOSYNTHESIS BI: CPT | Performed by: FAMILY MEDICINE

## 2025-01-08 PROCEDURE — 77067 SCR MAMMO BI INCL CAD: CPT | Performed by: FAMILY MEDICINE

## 2025-01-10 ENCOUNTER — APPOINTMENT (OUTPATIENT)
Dept: PHYSICAL THERAPY | Facility: HOSPITAL | Age: 45
End: 2025-01-10
Payer: COMMERCIAL

## 2025-01-14 ENCOUNTER — APPOINTMENT (OUTPATIENT)
Dept: PHYSICAL THERAPY | Facility: HOSPITAL | Age: 45
End: 2025-01-14
Payer: COMMERCIAL

## 2025-01-17 ENCOUNTER — APPOINTMENT (OUTPATIENT)
Dept: PHYSICAL THERAPY | Facility: HOSPITAL | Age: 45
End: 2025-01-17
Payer: COMMERCIAL

## 2025-01-21 ENCOUNTER — APPOINTMENT (OUTPATIENT)
Dept: PHYSICAL THERAPY | Facility: HOSPITAL | Age: 45
End: 2025-01-21
Payer: COMMERCIAL

## 2025-03-27 ENCOUNTER — NURSE ONLY (OUTPATIENT)
Facility: CLINIC | Age: 45
End: 2025-03-27

## 2025-03-27 DIAGNOSIS — Z12.11 COLON CANCER SCREENING: Primary | ICD-10-CM

## 2025-03-27 NOTE — PROGRESS NOTES
Dr. Durham  Called patient for scheduled telephone colon screen.   Please advise on colonoscopy and bowel prep orders.   Medications, pharmacy, and allergies reviewed.     Age 45-74 y/o: yes  › MD preference: none  › Insurance:  [place]  › Last PCP visit: 11/26/2024  › Last CBC: 6/21/2022-routed to provider since some values flagged red  › Date of positive FIT (if applicable): n/a  › H/W/BMI: 5'7\" 253 lbs BMI 39.62    Special comments/notes: n/a    Telephone Colon Screening Questionnaire Yes No   Are you currently experiencing any GI symptoms [] [x]   If yes, explain:     Rectal bleeding [] [x]   Black stool [] [x]   Dysphagia or food \"feeling stuck\" when eating [] [x]   Intractable vomiting [] [x]   Unexplained weight loss [] [x]   First colonoscopy [x] []   Family history of colon cancer [] [x]   Any issues with anesthesia [] [x]   Any recent complaints related to chest pain &/or shortness of breath [] [x]   Referred to a cardiologist?  [] [x]   History of  respiratory issues/oxygen/RUFINO/COPD [] [x]   History of devices (pacemaker/defibrillator) [] [x]   History of heart attack &/or stroke [] [x]     Medication Reconciliation  Yes  No   Anticoagulants (except Aspirin) [] [x]   Diabetic Medication [] [x]   Weight loss medication (phentermine/vyvanse/saxsenda/etc) she is not taking the zepbound on her list due to cost but I let her know if she ends up taking an injectable for weight loss it is a 7 day hold prior. [] [x]   Iron/herbal/multivitamin supplement(s) multivitamin [x] []   Usage of marijuana, CBD &/or vape product(s) [] [x]

## 2025-04-08 NOTE — PROGRESS NOTES
GI Scheduling Staff:     Please schedule: Colonoscopy with MAC     Please send SPLIT dose Golytely bowel prep.   -Buy over the counter dulcolax laxative, and take one tablet daily for 3 days prior to drinking the bowel prep.       Diagnosis: Screening    Medication adjustments:  -hold zepbound if taking for 7 days    >>>Please inform patient if new medications are started after scheduling procedure they need to call clinic to notify us.

## 2025-04-08 NOTE — PROGRESS NOTES
Scheduled for:  Colonoscopy [id number]  Provider Name: Dr. Durham   Date:  6/4/2025  Location:[facility]  Sedation:  MAC  Time: 9:35 am (Patient is aware that endo/eosc will call with arrival time )  Prep:    Golytely bowel prep.   -Buy over the counter dulcolax laxative, and take one tablet daily for 3 days prior to drinking the bowel prep.     Prep Instructions Given At  [facility] and in the mail   Meds/Allergies Reconciled?: Physician Reviewed   Diagnosis with codes:   Colon Screening Z12.11   Was patient informed to call insurance with codes (Y/N):  Yes  Referral sent?:  Referral was sent at the time of electronic surgical scheduling.  [facility] or [facility] notified?:  I sent an electronic request to Endo Scheduling and received a confirmation today.  Medication Orders:   -hold zepbound if taking for 7 days      Pt is aware to NOT take iron pills, herbal meds and diet supplements for 7 days before exam. Also to NOT take any form of alcohol, recreational drugs and any forms of ED meds 24 hours before exam.   Misc Orders:       Further instructions given by staff:  I provide prep instructions to patient  at [facility] and paper copy in mail reviewed date, and location, she verbalized that she understood and is aware to call if she has any questions.

## 2025-04-11 RX ORDER — ONDANSETRON 4 MG/1
4 TABLET, ORALLY DISINTEGRATING ORAL EVERY 8 HOURS PRN
Qty: 30 TABLET | Refills: 0 | OUTPATIENT
Start: 2025-04-11

## 2025-06-03 ENCOUNTER — TELEPHONE (OUTPATIENT)
Facility: CLINIC | Age: 45
End: 2025-06-03

## 2025-06-03 NOTE — TELEPHONE ENCOUNTER
Patient is returning the schedulers call. I tried to reach, but not available, during the time of the call.

## 2025-06-03 NOTE — TELEPHONE ENCOUNTER
Please see nurse only 3/27/25 for documentation of cancel.       Left message to call back. If patient calls back please transfer to schedulers.

## 2025-06-03 NOTE — TELEPHONE ENCOUNTER
Patient calling spoke to pre admissions and advised to call and cancel procedure due to patient not feeling well and sick. Please call.

## (undated) DEVICE — HEAD & NECK: Brand: MEDLINE INDUSTRIES, INC.

## (undated) DEVICE — TRAY SKIN PREP PVP-1

## (undated) DEVICE — SUT PDS II 3-0 PS-1 Z683G

## (undated) DEVICE — ENCORE® LATEX ACCLAIM SIZE 8, STERILE LATEX POWDER-FREE SURGICAL GLOVE: Brand: ENCORE

## (undated) DEVICE — CASED DISP BIPOLAR CORD

## (undated) DEVICE — SUCTION CANISTER, 3000CC,SAFELINER: Brand: DEROYAL

## (undated) DEVICE — SOLUTION  .9 1000ML BTL

## (undated) DEVICE — PROXIMATE RH ROTATING HEAD SKIN STAPLERS (35 WIDE) CONTAINS 35 STAINLESS STEEL STAPLES: Brand: PROXIMATE

## (undated) NOTE — Clinical Note
Dr. Suresh Ellington you see my patient soon? She has severe menorrhagia with severe iron deficiency anemia. Thanks,AG

## (undated) NOTE — LETTER
AUTHORIZATION FOR SURGICAL OPERATION OR OTHER PROCEDURE    1.  I hereby authorize Dr. Radha Carbajal and [facility] staff assigned to my case to perform the following operation and/or procedure at the [place] [address] Relationship to Patient:           []  Parent    Responsible person                          []  Spouse  In case of minor or                    [] Other  _____________   Incompetent name:  __________________________________________________

## (undated) NOTE — ED AVS SNAPSHOT
Chris Camp   MRN: [id number]    Department:  [facility] Emergency Department   Date of Visit:  12/28/2019           Disclosure     Insurance plans vary and the physician(s) referred by the ER may not be covered by your plan.  Please conta CARE PHYSICIAN AT ONCE OR RETURN IMMEDIATELY TO THE EMERGENCY DEPARTMENT. If you have been prescribed any medication(s), please fill your prescription right away and begin taking the medication(s) as directed.   If you believe that any of the medications

## (undated) NOTE — MR AVS SNAPSHOT
After Visit Summary   12/11/2019    Chayito Adorno    MRN: [id number]           Visit Information     Date & Time  12/11/2019 10:20 AM Provider  Kiran Christopher, [address] [place] [address], [address] [place] [address],[address], [facility]   between 7:30am to 6pm and on Saturday between 8am and 1pm. Online scheduling is available at [email or web address]. Evening and weekend appointments for your exam   are available.      It is the patient's responsibility to check with and follow their in messages to your doctor, view test results, renew prescriptions and request appointments. How Do I Sign Up? 1. In your Internet browser, go to [email or web address]  2.  Click on the Activate your Account if you have an activation code i experience and are looking for ways to make improvements. Your feedback will help us do so. For more information on [employer], please visit [email or web address]                   DO YOU KNOW WHERE TO GO?   Injury & illness are neve

## (undated) NOTE — LETTER
[facility], [address] [place] [address]. [facility] [address] Marlon Fallon       09/08/22        Patient: Kathryn Harris   YOB: 1980   Date of Visit: 9/8/2022       Dear  Dr. Sim Bonilla MD,      Thank you for referring Kathryn Harris to my practice. Please find my assessment and plan below. ASSESSMENT AND PLAN    1. Scalp cyst  1.5 cm occipital cyst on the left. We discussed excising this lesion under local anesthesia. She understands the risks which include but not be limited to postoperative pain, bleeding as well as recurrence. She accepts these risks and wishes to proceed. Sincerely,   Shilpa Solitario. Sarabjit Gann MD   [address] [place] [address]  [address] [facility] [id number]    Document electronically generated by:  Shilpa Solitario.  Sarabjit Gann MD

## (undated) NOTE — LETTER
6/16/2020              [address] [facility]                 Dear Mindi Alonso,    We tried to reach by phone but did not get a response from you.   We wanted to let you know that the mammogram department did